# Patient Record
Sex: FEMALE | Race: WHITE | NOT HISPANIC OR LATINO | URBAN - METROPOLITAN AREA
[De-identification: names, ages, dates, MRNs, and addresses within clinical notes are randomized per-mention and may not be internally consistent; named-entity substitution may affect disease eponyms.]

---

## 2020-02-04 ENCOUNTER — APPOINTMENT (RX ONLY)
Dept: URBAN - METROPOLITAN AREA CLINIC 24 | Facility: CLINIC | Age: 30
Setting detail: DERMATOLOGY
End: 2020-02-04

## 2020-02-04 DIAGNOSIS — B00.1 HERPESVIRAL VESICULAR DERMATITIS: ICD-10-CM

## 2020-02-04 DIAGNOSIS — Z71.89 OTHER SPECIFIED COUNSELING: ICD-10-CM

## 2020-02-04 DIAGNOSIS — L81.4 OTHER MELANIN HYPERPIGMENTATION: ICD-10-CM
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
## 2020-02-04 PROBLEM — D23.9 OTHER BENIGN NEOPLASM OF SKIN, UNSPECIFIED: Status: ACTIVE | Noted: 2020-02-04

## 2020-02-04 PROCEDURE — ? COUNSELING

## 2020-02-04 PROCEDURE — ? ADDITIONAL NOTES

## 2020-02-04 PROCEDURE — 99203 OFFICE O/P NEW LOW 30 MIN: CPT

## 2020-02-04 ASSESSMENT — LOCATION ZONE DERM
LOCATION ZONE: TRUNK
LOCATION ZONE: LIP
LOCATION ZONE: ARM

## 2020-02-04 ASSESSMENT — LOCATION DETAILED DESCRIPTION DERM
LOCATION DETAILED: RIGHT SUPERIOR LATERAL UPPER BACK
LOCATION DETAILED: LEFT POSTERIOR SHOULDER
LOCATION DETAILED: LEFT ANTERIOR SHOULDER
LOCATION DETAILED: RIGHT INFERIOR VERMILION LIP

## 2020-02-04 ASSESSMENT — LOCATION SIMPLE DESCRIPTION DERM
LOCATION SIMPLE: LEFT SHOULDER
LOCATION SIMPLE: RIGHT LIP
LOCATION SIMPLE: RIGHT BACK

## 2020-02-04 NOTE — PROCEDURE: ADDITIONAL NOTES
Additional Notes: Pt is on valtrex. No refills needed. She will call.  Pt is on 500 mg. She can take 4tabs in am and 4tabs pm when flares. 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 She is being followed by gynecologist.  She claims lysine helps prevent breakouts.
Detail Level: Simple

## 2020-06-01 ENCOUNTER — HOSPITAL ENCOUNTER (OUTPATIENT)
Dept: PHYSICAL THERAPY | Age: 30
End: 2020-06-01
Payer: COMMERCIAL

## 2020-06-04 ENCOUNTER — HOSPITAL ENCOUNTER (OUTPATIENT)
Dept: PHYSICAL THERAPY | Age: 30
Discharge: HOME | End: 2020-06-04
Payer: COMMERCIAL

## 2020-06-04 PROCEDURE — 97110 THERAPEUTIC EXERCISES: CPT

## 2020-06-04 PROCEDURE — 97161 PT EVAL LOW COMPLEX 20 MIN: CPT

## 2020-06-04 NOTE — PROGRESS NOTES
Frankey Arnt  : 1990  Primary: [address]  Secondary:  [address]  at [facility]  [address] , [address], [place]  [phone]   [phone]      OUTPATIENT PHYSICAL THERAPY: Daily Treatment Note 2020  ICD-10: Treatment Diagnosis: N39.46 Mixed incontinence (Urge and stress incontinence), R27.8 Lack of coordination (muscle incoordination)   Precautions/Allergies:   Patient has no allergy information on record. TREATMENT PLAN:  Effective Dates: 2020 TO 2020 (90 days). Frequency/Duration: 1 time a week for 90 Day(s) MEDICAL/REFERRING DIAGNOSIS:  Mixed incontinence [N39.46]   DATE OF ONSET:   REFERRING PHYSICIAN: Mercy Bolivar MD MD Orders: evaluate and treat  Return MD Appointment: -     Pre-treatment Symptoms/Complaints:  See evaluation  Pain: Initial:   0 Post Session:  0/10   Medications Last Reviewed:  2020   Updated Objective Findings:  See evaluation note from today   TREATMENT:   THERAPEUTIC EXERCISE: (10 minutes):  Exercises per grid below to improve mobility, strength and coordination. Required minimal visual, verbal and manual cues to promote proper body alignment, promote proper body posture and promote proper body mechanics. Progressed resistance, range and repetitions as indicated. All exercises performed with emphasis on kegel and breathing in order improve coordination, awareness and to minimize symptoms. Date:  20 Date:   Date:     Activity/Exercise Parameters Parameters Parameters   Patient Education Discussed HEP and POC     breathing reviewed     drops reviewed                                Pt gives verbal consent to internal  assessment/treatment no chaperon present. [employer] Portal     Treatment/Session Summary:  Pt reports good understanding of plan of care, as well as prescribed home exercise program.  All questions were answered to pt's satisfaction.  Pt was invited to call with any further questions or concerns. · Response to Treatment:  see evaluation. · Communication/Consultation:  None today  · Equipment provided today:  None today  · Recommendations/Intent for next treatment session: Next visit will focus on biofeedback, manual therapy, stretches, urge suppression.   Total Treatment Billable Duration:  10 minutes  PT Patient Time In/Time Out  Time In: 1300  Time Out: [address], DPT    Future Appointments   Date Time Provider Everton Calabresei   6/10/2020  1:00 PM Namita Mills, DPT [facility]   6/16/2020  2:00 PM Namita Mills, DPT [facility]   6/23/2020  2:00 PM Namita Mills, DPT [facility]   6/30/2020  1:00 PM Namita Mills, DPT [facility]   7/7/2020  2:00 PM Namita Mills, DPT [facility]   7/14/2020  1:00 PM Namita Mills, DPT [facility]

## 2020-06-04 NOTE — THERAPY EVALUATION
Sonny Bell  : 1990  Primary: [address]  Secondary:  [address]  at [facility]  [address] , [address], [place] [address]  [phone]   [phone]       OUTPATIENT PHYSICAL THERAPY:Initial Assessment 2020   ICD-10: Treatment Diagnosis: N39.46 Mixed incontinence (Urge and stress incontinence), R27.8 Lack of coordination (muscle incoordination)   Precautions/Allergies:   Patient has no allergy information on record. TREATMENT PLAN:  Effective Dates: 2020 TO 2020 (90 days). Frequency/Duration: 1 time a week for 90 Day(s) MEDICAL/REFERRING DIAGNOSIS:  Mixed incontinence [N39.46]   DATE OF ONSET:   REFERRING PHYSICIAN: Lenin Jain MD MD Orders: evaluate and treat  Return MD Appointment: -     INITIAL ASSESSMENT:  Ms. Brianna Bauer presents with pelvic floor muscle over activity, lack of coordination, endurance and strength. She has a lot of stress in her life right now which could be contributing to the over activity. Patient would benefit from skilled physical therapy to address her deficits and maximize her function. PROBLEM LIST (Impacting functional limitations):  1. Decreased Strength  2. Decreased Flexibility/Joint Mobility  3. Decreased [place] with Home Exercise Program  4. Decreased coordination INTERVENTIONS PLANNED:  1. Family Education  2. Home Exercise Program (HEP)  3. Manual Therapy  4. Neuromuscular Re-education/Strengthening  5. Therapeutic Activites  6. Therapeutic Exercise/Strengthening     GOALS: (Goals have been discussed and agreed upon with patient.)  1. Patient will verbalize an understanding of pelvic anatomy and causes of incontinence   2. Patient will demonstrate I with basic PFM HEP to improve awareness, coordination, and timing of PFM  3. Patient will demonstrate \"the knack\" to eliminate UI during cough/sneeze  4.  Patient will demonstrate 10 quick flicks of the pelvic floor muscle group, without compensation, to implement urge suppression appropriately with urgency of urination and decrease the number of pads used per day. 5. Pt with demonstrate normal voluntary relaxation of the pelvic floor muscle group to improve pelvic floor ROM and decrease pain. 6. Pt will independently perform proper toilet position       Outcome Measure:   Pelvic Floor Impact Questionnaire (PFIQ-7)  Score (out of 300) Initial:   Bladder/urinary: 47  Bowel/rectum: 0  Vagina/pelvis: 0  Total: 47 Most Recent:      Interpretation of Score: This survey asks questions concerning certain bowel, bladder, or pelvic symptoms and how much these symptoms interfere with daily activities. Each section is scored on a 0-3 scale, 3 representing the greatest disability. The scores of each section (out of 100) are added together for a total score out of 300. Medical Necessity:   · Patient demonstrates GOOD rehab potential due to higher previous functional level. Reason for Services/Other Comments:  · Patient continues to require skilled intervention due to above mentioned deficits  . Total Duration:   PT Patient Time In/Time Out  Time In: 1300  Time Out: 1400    Rehabilitation Potential For Stated Goals: Good  Regarding [facility] therapy, I certify that the treatment plan above will be carried out by a therapist or under their direction. Thank you for this referral,  Pranav Coe DPT     Referring Physician Signature: Jayleen Guzman MD _______________________________ Date _____________              HISTORY:   History of Present Injury/Illness (Reason for Referral):  Ms. Lizzeth Gabriel is a 28 yo female referred to pelvic floor physical therapy (PFPT) by Jayleen Guzman MD 2/2 mixed incontinence. Pt reports that symptoms began April 2019. She doesn't know how it started. Reports she can do physical activity and its fine its when she has to jump or jump rope. Happens with sneezing not coughing.  If she suddenly gets the urge to go it will happen on the way to the bathroom. The urge hits pretty hard. Leakage is more pronounced in the afternoon. Has a lot more stress in her life right now with a career change. Urinary: Frequency 9-12 x/day, 0 x/night. Positive for stress urinary incontinence, urge urinary incontinence, urinary urgency and urinary frequency. Negative for incomplete emptying, urinary hesitancy/intermittency, dysuria, hematuria and nocturia. Pt does not use pads for protection; 0 pads per day (PPD). Fluid intake: 1 gallon water/day; bladder irritants include: herbal/green tea in am  Bowel: Frequency 2-3 times a day. Positive for none. Negative for pain with bowel movement, pushing/straining with bowel movement, fecal incontinence, constipation and incomplete emptying. Sexual: Pt is sexually active with male partners. Contraception/birth control: sprintex. negative for dyspareunia. Pelvic Organ Prolapse/Pelvic Pain: Location: none. OB History: none    Past Medical History/Comorbidities:   Ms. Letitia Sam  has no past medical history on file. Ms. Letitia Sam  has no past surgical history on file. Social History/Living Environment:     Lives with parents  Have you ever had any pelvic trauma (orthopedic in nature, fall, MVA, etc.)? NO   Have you ever experienced any unwanted physical or sexual contact? NO   Have you ever experienced any form of medical trauma (GYN, urological, GI, etc)? NO     Prior Level of Function/Work/Activity:  Exercise- yoga, walking, weight training  Working- [employer], [employer] department  Personal Factors:          Sex:  female        Age:  27 y.o. Ambulatory/Rehab Services H2 Model Falls Risk Assessment    Risk Factors:       No Risk Factors Identified Ability to Rise from Chair:       (0)  Ability to rise in a single movement    Falls Prevention Plan:       No modifications necessary   Total: (5 or greater = High Risk): 0    ©2010 [employer]. All Rights Reserved.  [place] Patent [id number]. Federal Law prohibits the replication, distribution or use without written permission from [facility]     Current Medications:     No current outpatient medications on file. Date Last Reviewed:  6/4/2020   Number of Personal Factors/Comorbidities that affect the Plan of Care: 0: LOW COMPLEXITY   EXAMINATION:   OBSERVATION:   External Observation:   · Voluntary Contraction: present  · Voluntary Relaxation: present, dealyed  · Vaginal Vault Size: within normal limits    PALPATION:  increased tone throughout PFM, no pain    RANGE OF MOTION:  Decreased due to tone    STRENGTH:  P: Power, E: Endurance, R: Repetitions, QF: Quick Flicks, TrA: Transverse Abdominus  P 2   E 1   R 3   QF    TrA      COORDINATION:  PFM Coordination: decreased  Diaphragmatic breathing (DB): able to perform with cues         Body Structures Involved:  1. Muscles Body Functions Affected:  1. Genitourinary  2. Neuromusculoskeletal  3. Movement Related Activities and Participation Affected:  1. Self Care  2. Domestic Life  3.  Interpersonal Interactions and Relationships   Number of elements (examined above) that affect the Plan of Care: 1-2: LOW COMPLEXITY   CLINICAL PRESENTATION:   Presentation: Stable and uncomplicated: LOW COMPLEXITY   CLINICAL DECISION MAKING:   Use of outcome tool(s) and clinical judgement create a POC that gives a: Clear prediction of patient's progress: LOW COMPLEXITY

## 2020-06-22 ENCOUNTER — HOSPITAL ENCOUNTER (OUTPATIENT)
Dept: PHYSICAL THERAPY | Age: 30
Discharge: HOME | End: 2020-06-22
Payer: COMMERCIAL

## 2020-06-22 PROCEDURE — 97112 NEUROMUSCULAR REEDUCATION: CPT

## 2020-06-22 PROCEDURE — 97140 MANUAL THERAPY 1/> REGIONS: CPT

## 2020-06-22 NOTE — PROGRESS NOTES
Scooter Ball  : 1990  Primary: [address]  Secondary:  [address]  at [facility]  [address], [address], [place] [address]  [phone]   [phone]      OUTPATIENT PHYSICAL THERAPY: Daily Treatment Note 2020  ICD-10: Treatment Diagnosis: N39.46 Mixed incontinence (Urge and stress incontinence), R27.8 Lack of coordination (muscle incoordination)   Precautions/Allergies:   Patient has no allergy information on record. TREATMENT PLAN:  Effective Dates: 2020 TO 2020 (90 days). Frequency/Duration: 1 time a week for 90 Day(s) MEDICAL/REFERRING DIAGNOSIS:  Mixed incontinence [N39.46]   DATE OF ONSET:   REFERRING PHYSICIAN: Ildefonso Delaney MD MD Orders: evaluate and treat  Return MD Appointment: -     Pre-treatment Symptoms/Complaints:  Patient reports she isn't having the sensation that she has to pull her pants down quick to go to the bathroom. She at first had a hard time feeling what relaxed was but now its better. Pain: Initial:   0 Post Session:  0/10   Medications Last Reviewed:  2020   Updated Objective Findings:  see below   Ms. Bee Murcia is a 28 yo female referred to pelvic floor physical therapy (PFPT) by Ildefonso Delaney MD  mixed incontinence. Pt reports that symptoms began 2019. She doesn't know how it started. Reports she can do physical activity and its fine its when she has to jump or jump rope. Happens with sneezing not coughing. If she suddenly gets the urge to go it will happen on the way to the bathroom. The urge hits pretty hard. Leakage is more pronounced in the afternoon. Has a lot more stress in her life right now with a career change.      Urinary: Frequency 9-12 x/day, 0 x/night. Positive for stress urinary incontinence, urge urinary incontinence, urinary urgency and urinary frequency. Negative for incomplete emptying, urinary hesitancy/intermittency, dysuria, hematuria and nocturia.  Pt does not use pads for protection; 0 pads per day (PPD). Fluid intake: 1 gallon water/day; bladder irritants include: herbal/green tea in am  Bowel: Frequency 2-3 times a day. Positive for none. Negative for pain with bowel movement, pushing/straining with bowel movement, fecal incontinence, constipation and incomplete emptying. Sexual: Pt is sexually active with male partners. Contraception/birth control: sprintex. negative for dyspareunia. Pelvic Organ Prolapse/Pelvic Pain: Location: none. OB History: none    External Observation:   · Voluntary Contraction: present  · Voluntary Relaxation: present, dealyed  · Vaginal Vault Size: within normal limits     PALPATION:  increased tone throughout PFM, no pain     RANGE OF MOTION:  Decreased due to tone     STRENGTH:  P: Power, E: Endurance, R: Repetitions, QF: Quick Flicks, TrA: Transverse Abdominus  P 2   E 1   R 3   QF     TrA        COORDINATION:  PFM Coordination: decreased  Diaphragmatic breathing (DB): able to perform with cues     TREATMENT:   THERAPEUTIC EXERCISE: (0 minutes):  Exercises per grid below to improve mobility, strength and coordination. Required minimal visual, verbal and manual cues to promote proper body alignment, promote proper body posture and promote proper body mechanics. Progressed resistance, range and repetitions as indicated. All exercises performed with emphasis on kegel and breathing in order improve coordination, awareness and to minimize symptoms. Date:  6-4-20 Date:   Date:     Activity/Exercise Parameters Parameters Parameters   Patient Education Discussed HEP and POC     breathing reviewed     drops reviewed                                Pt gives verbal consent to internal  assessment/treatment no chaperon present.      NEURO REEDUCATION: (30 minutes) to improve control and coordination of pelvic floor     Date:  6-22-20 Date:   Date:     Activity/Exercise Parameters Parameters Parameters   Biofeedback With sEMG was utilized for coordination of PFM. Supine, Sitting, Standing                                           MANUAL THERAPY: (30 minutes) to improve soft tissue tone and mobility  Date Type Location Comments   6/22/2020 Internal assessment/treatment Via vaginal canal SP, CTM                                         (Used abbreviations: MET - muscle energy technique; SCS- Strain counter strain; CTM-Connective tissue mobilizations; CR- Contract/relax; SP- Sustained pressure, TrP-Trigger point release, IASTM- Instrument assisted soft tissue mobilizations, TDN-Trigger point dry needling)      [facility] Portal     Treatment/Session Summary:  Pt reports good understanding of plan of care, as well as prescribed home exercise program.  All questions were answered to pt's satisfaction. Pt was invited to call with any further questions or concerns. · Response to Treatment:  Patient demonstrates better control of pelvic floor and with drops. Less tightness  · Communication/Consultation:  None today  · Equipment provided today:  None today  · Recommendations/Intent for next treatment session: Next visit will focus on biofeedback, manual therapy, stretches, urge suppression.   Total Treatment Billable Duration:  25 minutes manual, 30 minutes neuro  PT Patient Time In/Time Out  Time In: 0900  Time Out: [address], DPT    Future Appointments   Date Time Provider Everton Fishman   6/30/2020  1:00 PM CURTIS Rocha   7/7/2020  2:00 PM CURTIS Rocha   7/14/2020  1:00 PM CURTIS Rocha

## 2020-06-23 ENCOUNTER — APPOINTMENT (OUTPATIENT)
Dept: PHYSICAL THERAPY | Age: 30
End: 2020-06-23
Payer: COMMERCIAL

## 2020-06-30 ENCOUNTER — APPOINTMENT (RX ONLY)
Dept: URBAN - METROPOLITAN AREA CLINIC 24 | Facility: CLINIC | Age: 30
Setting detail: DERMATOLOGY
End: 2020-06-30

## 2020-06-30 ENCOUNTER — HOSPITAL ENCOUNTER (OUTPATIENT)
Dept: PHYSICAL THERAPY | Age: 30
Discharge: HOME | End: 2020-06-30
Payer: COMMERCIAL

## 2020-06-30 DIAGNOSIS — L71.0 PERIORAL DERMATITIS: ICD-10-CM

## 2020-06-30 DIAGNOSIS — D22 MELANOCYTIC NEVI: ICD-10-CM

## 2020-06-30 DIAGNOSIS — L84 CORNS AND CALLOSITIES: ICD-10-CM

## 2020-06-30 PROBLEM — D22.39 MELANOCYTIC NEVI OF OTHER PARTS OF FACE: Status: ACTIVE | Noted: 2020-06-30

## 2020-06-30 PROCEDURE — ? ADDITIONAL NOTES

## 2020-06-30 PROCEDURE — 97530 THERAPEUTIC ACTIVITIES: CPT

## 2020-06-30 PROCEDURE — 97110 THERAPEUTIC EXERCISES: CPT

## 2020-06-30 PROCEDURE — 97140 MANUAL THERAPY 1/> REGIONS: CPT

## 2020-06-30 PROCEDURE — 99213 OFFICE O/P EST LOW 20 MIN: CPT

## 2020-06-30 PROCEDURE — ? COUNSELING

## 2020-06-30 PROCEDURE — ? PRESCRIPTION

## 2020-06-30 PROCEDURE — ? TREATMENT REGIMEN

## 2020-06-30 RX ORDER — METRONIDAZOLE 7.5 MG/G
CREAM TOPICAL
Qty: 1 | Refills: 2 | Status: ERX | COMMUNITY
Start: 2020-06-30

## 2020-06-30 RX ADMIN — METRONIDAZOLE: 7.5 CREAM TOPICAL at 00:00

## 2020-06-30 ASSESSMENT — LOCATION SIMPLE DESCRIPTION DERM
LOCATION SIMPLE: RIGHT CHEEK
LOCATION SIMPLE: RIGHT 5TH TOE
LOCATION SIMPLE: LEFT CHEEK
LOCATION SIMPLE: CHIN
LOCATION SIMPLE: RIGHT SUBMANDIBULAR AREA

## 2020-06-30 ASSESSMENT — LOCATION DETAILED DESCRIPTION DERM
LOCATION DETAILED: LEFT MENTAL CREASE
LOCATION DETAILED: LEFT INFERIOR CENTRAL MALAR CHEEK
LOCATION DETAILED: RIGHT MEDIAL 5TH TOE
LOCATION DETAILED: RIGHT SUPERIOR MEDIAL BUCCAL CHEEK
LOCATION DETAILED: RIGHT SUBMANDIBULAR AREA

## 2020-06-30 ASSESSMENT — LOCATION ZONE DERM
LOCATION ZONE: FACE
LOCATION ZONE: TOE

## 2020-07-07 ENCOUNTER — HOSPITAL ENCOUNTER (OUTPATIENT)
Dept: PHYSICAL THERAPY | Age: 30
Discharge: HOME | End: 2020-07-07
Payer: COMMERCIAL

## 2020-07-07 PROCEDURE — 97110 THERAPEUTIC EXERCISES: CPT

## 2020-07-07 PROCEDURE — 97140 MANUAL THERAPY 1/> REGIONS: CPT

## 2020-07-07 NOTE — PROGRESS NOTES
Vikas Vieyra  : 1990  Primary: [address]  Secondary:  [address]  at [facility]  [address], [address], [place] [address]  [phone]   [phone]      OUTPATIENT PHYSICAL THERAPY: Daily Treatment Note 2020  ICD-10: Treatment Diagnosis: N39.46 Mixed incontinence (Urge and stress incontinence), R27.8 Lack of coordination (muscle incoordination)   Precautions/Allergies:   Patient has no allergy information on record. TREATMENT PLAN:  Effective Dates: 2020 TO 2020 (90 days). Frequency/Duration: 1 time a week for 90 Day(s) MEDICAL/REFERRING DIAGNOSIS:  Mixed incontinence [N39.46]   DATE OF ONSET:   REFERRING PHYSICIAN: Jolynn Maldonado MD MD Orders: evaluate and treat  Return MD Appointment: -     Pre-treatment Symptoms/Complaints:  Patient reports the strengthening exercises are tougher than she thought. She is having trouble with clamshells  The urge suppression has helped. She has had a stress full week. Pain: Initial:   0 Post Session:  0/10   Medications Last Reviewed:  2020   Updated Objective Findings:  see below   Ms. Prabhakar Richard is a 28 yo female referred to pelvic floor physical therapy (PFPT) by Jolynn Maldonado MD  mixed incontinence. Pt reports that symptoms began 2019. She doesn't know how it started. Reports she can do physical activity and its fine its when she has to jump or jump rope. Happens with sneezing not coughing. If she suddenly gets the urge to go it will happen on the way to the bathroom. The urge hits pretty hard. Leakage is more pronounced in the afternoon. Has a lot more stress in her life right now with a career change.      Urinary: Frequency 9-12 x/day, 0 x/night. Positive for stress urinary incontinence, urge urinary incontinence, urinary urgency and urinary frequency. Negative for incomplete emptying, urinary hesitancy/intermittency, dysuria, hematuria and nocturia.  Pt does not use pads for protection; 0 pads per day (PPD). Fluid intake: 1 gallon water/day; bladder irritants include: herbal/green tea in am  Bowel: Frequency 2-3 times a day. Positive for none. Negative for pain with bowel movement, pushing/straining with bowel movement, fecal incontinence, constipation and incomplete emptying. Sexual: Pt is sexually active with male partners. Contraception/birth control: sprintex. negative for dyspareunia. Pelvic Organ Prolapse/Pelvic Pain: Location: none. OB History: none    External Observation:   · Voluntary Contraction: present  · Voluntary Relaxation: present, dealyed  · Vaginal Vault Size: within normal limits     PALPATION:  increased tone throughout PFM, no pain     RANGE OF MOTION:  Decreased due to tone     STRENGTH:  P: Power, E: Endurance, R: Repetitions, QF: Quick Flicks, TrA: Transverse Abdominus  P 2   E 1   R 3   QF     TrA        COORDINATION:  PFM Coordination: decreased  Diaphragmatic breathing (DB): able to perform with cues     TREATMENT:   THERAPEUTIC EXERCISE: (30 minutes):  Exercises per grid below to improve mobility, strength and coordination. Required minimal visual, verbal and manual cues to promote proper body alignment, promote proper body posture and promote proper body mechanics. Progressed resistance, range and repetitions as indicated. All exercises performed with emphasis on kegel and breathing in order improve coordination, awareness and to minimize symptoms.     Date:  6-4-20 Date:  6-30-20 Date:  7-7-20   Activity/Exercise Parameters Parameters Parameters   Patient Education Discussed HEP and POC     breathing reviewed       drops reviewed       SKTC     HEP   Figure 4     HEP   butterfly     HEP   bridge  x20   x20 blue band   clamshell  x20   x20 blue band   Quadruped  x20   x20   Sit to stand    x20 x20 blue band   Rows     x20 blue band   Extensions   x20 blue band        Pt gives verbal consent to internal  assessment/treatment no chaperon present. NEURO REEDUCATION: ( minutes) to improve control and coordination of pelvic floor     Date:  6-22-20 Date:   Date:     Activity/Exercise Parameters Parameters Parameters   Biofeedback With sEMG was utilized for coordination of PFM. Supine, Sitting, Standing                                           MANUAL THERAPY: (30 minutes) to improve soft tissue tone and mobility  Date Type Location Comments   7/7/2020 Internal assessment/treatment Via vaginal canal SP, CTM                                         (Used abbreviations: MET - muscle energy technique; SCS- Strain counter strain; CTM-Connective tissue mobilizations; CR- Contract/relax; SP- Sustained pressure, TrP-Trigger point release, IASTM- Instrument assisted soft tissue mobilizations, TDN-Trigger point dry needling)    THERAPEUTIC ACTIVITY: (0 minutes)   -discussed, reviewed, performed urge suppression      [employer] Portal     Treatment/Session Summary:  Pt reports good understanding of plan of care, as well as prescribed home exercise program.  All questions were answered to pt's satisfaction. Pt was invited to call with any further questions or concerns. · Response to Treatment:  Patient with mild increase tightness in pelvic floor today due to stress levels over the weekend. · Communication/Consultation:  None today  · Equipment provided today:  None today  · Recommendations/Intent for next treatment session: Next visit will focus on biofeedback, manual therapy, stretches, urge suppression.   Total Treatment Billable Duration:  30 minutes manual, 25 minutes there ex  PT Patient Time In/Time Out  Time In: 1400  Time Out: [address] Shravan Lawson DPT    Future Appointments   Date Time Provider Everton Fishman   7/14/2020  1:00 PM Marcelo Arroyo DPT [facility]

## 2020-07-15 ENCOUNTER — APPOINTMENT (RX ONLY)
Dept: URBAN - METROPOLITAN AREA CLINIC 23 | Facility: CLINIC | Age: 30
Setting detail: DERMATOLOGY
End: 2020-07-15

## 2020-07-15 ENCOUNTER — HOSPITAL ENCOUNTER (OUTPATIENT)
Dept: PHYSICAL THERAPY | Age: 30
Discharge: HOME | End: 2020-07-15
Payer: COMMERCIAL

## 2020-07-15 DIAGNOSIS — L71.0 PERIORAL DERMATITIS: ICD-10-CM | Status: RESOLVING

## 2020-07-15 PROCEDURE — 97140 MANUAL THERAPY 1/> REGIONS: CPT

## 2020-07-15 PROCEDURE — ? TREATMENT REGIMEN

## 2020-07-15 PROCEDURE — 97110 THERAPEUTIC EXERCISES: CPT

## 2020-07-15 PROCEDURE — 99212 OFFICE O/P EST SF 10 MIN: CPT

## 2020-07-15 PROCEDURE — ? COUNSELING

## 2020-07-15 ASSESSMENT — LOCATION SIMPLE DESCRIPTION DERM
LOCATION SIMPLE: LEFT CHEEK
LOCATION SIMPLE: RIGHT CHEEK
LOCATION SIMPLE: CHIN

## 2020-07-15 ASSESSMENT — LOCATION DETAILED DESCRIPTION DERM
LOCATION DETAILED: LEFT INFERIOR CENTRAL MALAR CHEEK
LOCATION DETAILED: RIGHT SUPERIOR MEDIAL BUCCAL CHEEK
LOCATION DETAILED: LEFT MENTAL CREASE

## 2020-07-15 ASSESSMENT — LOCATION ZONE DERM: LOCATION ZONE: FACE

## 2020-07-15 NOTE — PROGRESS NOTES
Karla Ernie  : 1990  Primary: [address]  Secondary:  [address]  at [facility]  [address], [place] [address]  [phone]   [phone]      OUTPATIENT PHYSICAL THERAPY: Daily Treatment Note 7/15/2020  ICD-10: Treatment Diagnosis: N39.46 Mixed incontinence (Urge and stress incontinence), R27.8 Lack of coordination (muscle incoordination)   Precautions/Allergies:   Patient has no allergy information on record. TREATMENT PLAN:  Effective Dates: 2020 TO 2020 (90 days). Frequency/Duration: 1 time a week for 90 Day(s) MEDICAL/REFERRING DIAGNOSIS:  Mixed incontinence [N39.46]   DATE OF ONSET:   REFERRING PHYSICIAN: Nataly Boykin MD MD Orders: evaluate and treat  Return MD Appointment: -     Pre-treatment Symptoms/Complaints:  Patient reports that her stress levels have been higher which have caused her to have a difficult time when she is relaxing. She feels like her symptoms have hit a plateau. Pain: Initial:   0 Post Session:  0/10   Medications Last Reviewed:  7/15/2020   Updated Objective Findings:  see below   Ms. Bea Sanchez is a [address] [place] yo female referred to pelvic floor physical therapy (PFPT) by Nataly Boykin MD  mixed incontinence. Pt reports that symptoms began 2019. She doesn't know how it started. Reports she can do physical activity and its fine its when she has to jump or jump rope. Happens with sneezing not coughing. If she suddenly gets the urge to go it will happen on the way to the bathroom. The urge hits pretty hard. Leakage is more pronounced in the afternoon. Has a lot more stress in her life right now with a career change.      Urinary: Frequency 9-12 x/day, 0 x/night. Positive for stress urinary incontinence, urge urinary incontinence, urinary urgency and urinary frequency. Negative for incomplete emptying, urinary hesitancy/intermittency, dysuria, hematuria and nocturia.  Pt does not use pads for protection; 0 pads per day (PPD). Fluid intake: 1 gallon water/day; bladder irritants include: herbal/green tea in am  Bowel: Frequency 2-3 times a day. Positive for none. Negative for pain with bowel movement, pushing/straining with bowel movement, fecal incontinence, constipation and incomplete emptying. Sexual: Pt is sexually active with male partners. Contraception/birth control: sprintex. negative for dyspareunia. Pelvic Organ Prolapse/Pelvic Pain: Location: none. OB History: none    External Observation:   · Voluntary Contraction: present  · Voluntary Relaxation: present, dealyed  · Vaginal Vault Size: within normal limits     PALPATION:  increased tone throughout PFM, no pain     RANGE OF MOTION:  Decreased due to tone     STRENGTH:  P: Power, E: Endurance, R: Repetitions, QF: Quick Flicks, TrA: Transverse Abdominus  P 2   E 1   R 3   QF     TrA        COORDINATION:  PFM Coordination: decreased  Diaphragmatic breathing (DB): able to perform with cues     TREATMENT:   THERAPEUTIC EXERCISE: (30 minutes):  Exercises per grid below to improve mobility, strength and coordination. Required minimal visual, verbal and manual cues to promote proper body alignment, promote proper body posture and promote proper body mechanics. Progressed resistance, range and repetitions as indicated. All exercises performed with emphasis on kegel and breathing in order improve coordination, awareness and to minimize symptoms.     Date:  6-4-20 Date:  6-30-20 Date:  7-7-20 Date:  7-15-20   Activity/Exercise Parameters Parameters Parameters    Patient Education Discussed HEP and POC      breathing reviewed        drops reviewed        SKTC     HEP    Figure 4     HEP    butterfly     HEP    bridge  x20   x20 blue band x20 blue band   clamshell  x20   x20 blue band x20 blue band   Quadruped  x20   x20    Sit to stand    x20 x20 blue band x20 blue band   Rows     x20 blue band x20 blue band   Extensions   x20 blue band   x20 blue band      Pt gives verbal consent to internal  assessment/treatment no chaperon present. NEURO REEDUCATION: ( minutes) to improve control and coordination of pelvic floor     Date:  6-22-20 Date:   Date:     Activity/Exercise Parameters Parameters Parameters   Biofeedback With sEMG was utilized for coordination of PFM. Supine, Sitting, Standing                                           MANUAL THERAPY: (30 minutes) to improve soft tissue tone and mobility  Date Type Location Comments   7/15/2020 Internal assessment/treatment Via vaginal canal SP, CTM                                         (Used abbreviations: MET - muscle energy technique; SCS- Strain counter strain; CTM-Connective tissue mobilizations; CR- Contract/relax; SP- Sustained pressure, TrP-Trigger point release, IASTM- Instrument assisted soft tissue mobilizations, TDN-Trigger point dry needling)    THERAPEUTIC ACTIVITY: (0 minutes)   -discussed, reviewed, performed urge suppression      [employer] Portal     Treatment/Session Summary:  Pt reports good understanding of plan of care, as well as prescribed home exercise program.  All questions were answered to pt's satisfaction. Pt was invited to call with any further questions or concerns. · Response to Treatment:  Patient with mild increase tightness in pelvic floor today due to stress levels over the weekend. Improvement in coordination with there ex   · Communication/Consultation:  None today  · Equipment provided today:  None today  · Recommendations/Intent for next treatment session: Next visit will focus on biofeedback, manual therapy, stretches, urge suppression. Total Treatment Billable Duration:  30 minutes manual, 25 minutes there ex  PT Patient Time In/Time Out  Time In: 1400  Time Out: Elina Vaz[address], DPT    No future appointments.

## 2020-07-15 NOTE — PROCEDURE: TREATMENT REGIMEN
Detail Level: Zone
Initiate Treatment: Metronidazole cream
Plan: Recommended patient use metronidazole for 30 days.
Discontinue Regimen: Elidel

## 2020-07-20 ENCOUNTER — RX ONLY (OUTPATIENT)
Age: 30
Setting detail: RX ONLY
End: 2020-07-20

## 2020-07-20 RX ORDER — PIMECROLIMUS 10 MG/G
CREAM TOPICAL
Qty: 1 | Refills: 0 | Status: ERX | COMMUNITY
Start: 2020-07-20

## 2020-07-20 RX ORDER — DOXYCYCLINE 100 MG/1
CAPSULE ORAL
Qty: 70 | Refills: 0 | Status: ERX | COMMUNITY
Start: 2020-07-20

## 2020-07-21 ENCOUNTER — APPOINTMENT (OUTPATIENT)
Dept: PHYSICAL THERAPY | Age: 30
End: 2020-07-21
Payer: COMMERCIAL

## 2020-07-23 ENCOUNTER — HOSPITAL ENCOUNTER (OUTPATIENT)
Dept: PHYSICAL THERAPY | Age: 30
Discharge: HOME | End: 2020-07-23
Payer: COMMERCIAL

## 2020-07-23 PROCEDURE — 97140 MANUAL THERAPY 1/> REGIONS: CPT

## 2020-07-23 PROCEDURE — 97110 THERAPEUTIC EXERCISES: CPT

## 2020-07-23 NOTE — PROGRESS NOTES
Indiranejessica Jani  : 1990  Primary: [address]  Secondary:  [address]  at [facility]  [address] , [address], [place] [address]  [phone]   [phone]      OUTPATIENT PHYSICAL THERAPY: Daily Treatment Note 2020  ICD-10: Treatment Diagnosis: N39.46 Mixed incontinence (Urge and stress incontinence), R27.8 Lack of coordination (muscle incoordination)   Precautions/Allergies:   Patient has no allergy information on record. TREATMENT PLAN:  Effective Dates: 2020 TO 2020 (90 days). Frequency/Duration: 1 time a week for 90 Day(s) MEDICAL/REFERRING DIAGNOSIS:  Mixed incontinence [N39.46]   DATE OF ONSET:   REFERRING PHYSICIAN: Melody Mcpherson MD MD Orders: evaluate and treat  Return MD Appointment: -     Pre-treatment Symptoms/Complaints:  Patient reports she is now on doxycyclin for her face for 6 weeks. She has increased a lot of water intake. She sneezed and had leakage but she doesn't know if its b/c she had been drinking more water. Urge suppression is still working. Pain: Initial:   0 Post Session:  0/10   Medications Last Reviewed:  2020   Updated Objective Findings:  see below   Ms. Sabrina Vaughn is a 28 yo female referred to pelvic floor physical therapy (PFPT) by Melody Mcpherson MD  mixed incontinence. Pt reports that symptoms began 2019. She doesn't know how it started. Reports she can do physical activity and its fine its when she has to jump or jump rope. Happens with sneezing not coughing. If she suddenly gets the urge to go it will happen on the way to the bathroom. The urge hits pretty hard. Leakage is more pronounced in the afternoon. Has a lot more stress in her life right now with a career change.      Urinary: Frequency 9-12 x/day, 0 x/night. Positive for stress urinary incontinence, urge urinary incontinence, urinary urgency and urinary frequency.  Negative for incomplete emptying, urinary hesitancy/intermittency, dysuria, hematuria and nocturia. Pt does not use pads for protection; 0 pads per day (PPD). Fluid intake: 1 gallon water/day; bladder irritants include: herbal/green tea in am  Bowel: Frequency 2-3 times a day. Positive for none. Negative for pain with bowel movement, pushing/straining with bowel movement, fecal incontinence, constipation and incomplete emptying. Sexual: Pt is sexually active with male partners. Contraception/birth control: sprintex. negative for dyspareunia. Pelvic Organ Prolapse/Pelvic Pain: Location: none. OB History: none    External Observation:   · Voluntary Contraction: present  · Voluntary Relaxation: present, dealyed  · Vaginal Vault Size: within normal limits     PALPATION:  increased tone throughout PFM, no pain     RANGE OF MOTION:  Decreased due to tone     STRENGTH:  P: Power, E: Endurance, R: Repetitions, QF: Quick Flicks, TrA: Transverse Abdominus  P 2   E 1   R 3   QF     TrA        COORDINATION:  PFM Coordination: decreased  Diaphragmatic breathing (DB): able to perform with cues     TREATMENT:   THERAPEUTIC EXERCISE: (30 minutes):  Exercises per grid below to improve mobility, strength and coordination. Required minimal visual, verbal and manual cues to promote proper body alignment, promote proper body posture and promote proper body mechanics. Progressed resistance, range and repetitions as indicated. All exercises performed with emphasis on kegel and breathing in order improve coordination, awareness and to minimize symptoms.     Date:  6-4-20 Date:  6-30-20 Date:  7-7-20 Date:  7-15-20 Date:  7-23-20   Activity/Exercise Parameters Parameters Parameters     Patient Education Discussed HEP and POC       breathing reviewed         drops reviewed         SKTC     HEP     Figure 4     HEP     butterfly     HEP     bridge  x20   x20 blue band x20 blue band x20 blue band   clamshell  x20   x20 blue band x20 blue band x20 blue band   Quadruped x20   x20     Sit to stand    x20 x20 blue band x20 blue band x20 blue band   Rows     x20 blue band x20 blue band x20 blue band   Extensions   x20 blue band   x20 blue band x20 blue band   Sidestepping     50 feet x 2      Pt gives verbal consent to internal  assessment/treatment no chaperon present. NEURO REEDUCATION: ( minutes) to improve control and coordination of pelvic floor     Date:  6-22-20 Date:   Date:     Activity/Exercise Parameters Parameters Parameters   Biofeedback With sEMG was utilized for coordination of PFM. Supine, Sitting, Standing                                           MANUAL THERAPY: (30 minutes) to improve soft tissue tone and mobility  Date Type Location Comments   7/23/2020 Internal assessment/treatment Via vaginal canal SP, CTM                                         (Used abbreviations: MET - muscle energy technique; SCS- Strain counter strain; CTM-Connective tissue mobilizations; CR- Contract/relax; SP- Sustained pressure, TrP-Trigger point release, IASTM- Instrument assisted soft tissue mobilizations, TDN-Trigger point dry needling)    THERAPEUTIC ACTIVITY: (0 minutes)   -discussed, reviewed, performed urge suppression      [employer] Portal     Treatment/Session Summary:  Pt reports good understanding of plan of care, as well as prescribed home exercise program.  All questions were answered to pt's satisfaction. Pt was invited to call with any further questions or concerns. · Response to Treatment:  Patient had less tightness to pelvic floor today. Progressing with there ex. · Communication/Consultation:  None today  · Equipment provided today:  None today  · Recommendations/Intent for next treatment session: Next visit will focus on biofeedback, manual therapy, stretches, urge suppression.   Total Treatment Billable Duration:  30 minutes manual, 25 minutes there ex  PT Patient Time In/Time Out  Time In: 1400  Time Out: [address], T    Future Appointments Date Time Provider Everton Fishman   7/29/2020 11:00 AM Alcides Garcia, DPT [facility]

## 2020-08-04 NOTE — PROGRESS NOTES
Dyan Ramemy  : 1990  Primary: [address]  Secondary:  [address]  at [facility]  [address], [address], [place] [address]  [phone]   [phone]      OUTPATIENT PHYSICAL THERAPY: Daily Treatment Note 2020  ICD-10: Treatment Diagnosis: N39.46 Mixed incontinence (Urge and stress incontinence), R27.8 Lack of coordination (muscle incoordination)   Precautions/Allergies:   Patient has no allergy information on record. TREATMENT PLAN:  Effective Dates: 2020 TO 2020 (90 days). Frequency/Duration: 1 time a week for 90 Day(s) MEDICAL/REFERRING DIAGNOSIS:  Mixed incontinence [N39.46]   DATE OF ONSET:   REFERRING PHYSICIAN: Amish Mujica MD MD Orders: evaluate and treat  Return MD Appointment: September (telehealth)     Pre-treatment Symptoms/Complaints:  Patient reports leakage is status quo. She was sick the past two weeks. She still needs to work on her relaxation. Pain: Initial:   0 Post Session:  0/10   Medications Last Reviewed:  2020   Updated Objective Findings:  see below   Ms. Patrick Bahena is a 28 yo female referred to pelvic floor physical therapy (PFPT) by Amish Mujica MD  mixed incontinence. Pt reports that symptoms began 2019. She doesn't know how it started. Reports she can do physical activity and its fine its when she has to jump or jump rope. Happens with sneezing not coughing. If she suddenly gets the urge to go it will happen on the way to the bathroom. The urge hits pretty hard. Leakage is more pronounced in the afternoon. Has a lot more stress in her life right now with a career change.      Urinary: Frequency 9-12 x/day, 0 x/night. Positive for stress urinary incontinence, urge urinary incontinence, urinary urgency and urinary frequency. Negative for incomplete emptying, urinary hesitancy/intermittency, dysuria, hematuria and nocturia. Pt does not use pads for protection; 0 pads per day (PPD). Fluid intake: 1 gallon water/day; bladder irritants include: herbal/green tea in am  Bowel: Frequency 2-3 times a day. Positive for none. Negative for pain with bowel movement, pushing/straining with bowel movement, fecal incontinence, constipation and incomplete emptying. Sexual: Pt is sexually active with male partners. Contraception/birth control: sprintex. negative for dyspareunia. Pelvic Organ Prolapse/Pelvic Pain: Location: none. OB History: none    External Observation:   · Voluntary Contraction: present  · Voluntary Relaxation: present, dealyed  · Vaginal Vault Size: within normal limits     PALPATION:  increased tone throughout PFM, no pain     RANGE OF MOTION:  Decreased due to tone     STRENGTH:  P: Power, E: Endurance, R: Repetitions, QF: Quick Flicks, TrA: Transverse Abdominus  P 2   E 1   R 3   QF     TrA        COORDINATION:  PFM Coordination: decreased  Diaphragmatic breathing (DB): able to perform with cues     TREATMENT:   THERAPEUTIC EXERCISE: (15 minutes):  Exercises per grid below to improve mobility, strength and coordination. Required minimal visual, verbal and manual cues to promote proper body alignment, promote proper body posture and promote proper body mechanics. Progressed resistance, range and repetitions as indicated. All exercises performed with emphasis on kegel and breathing in order improve coordination, awareness and to minimize symptoms.     Date:  6-4-20 Date:  6-30-20 Date:  7-7-20 Date:  7-15-20 Date:  7-23-20 Date:  8-5-20   Activity/Exercise Parameters Parameters Parameters      Patient Education Discussed HEP and POC        breathing reviewed          drops reviewed          SKTC     HEP   30 sec hold x 3   Figure 4     HEP   30 sec hold x 3   butterfly     HEP   30 sec hold x 3   bridge  x20   x20 blue band x20 blue band x20 blue band    clamshell  x20   x20 blue band x20 blue band x20 blue band    Quadruped  x20   x20      Sit to stand    x20 x20 blue band x20 blue band x20 blue band    Rows     x20 blue band x20 blue band x20 blue band    Extensions   x20 blue band   x20 blue band x20 blue band    Sidestepping     50 feet x 2       Pt gives verbal consent to internal  assessment/treatment no chaperon present. NEURO REEDUCATION: ( minutes) to improve control and coordination of pelvic floor     Date:  6-22-20 Date:   Date:     Activity/Exercise Parameters Parameters Parameters   Biofeedback With sEMG was utilized for coordination of PFM. Supine, Sitting, Standing                                           MANUAL THERAPY: (15 minutes) to improve soft tissue tone and mobility  Date Type Location Comments   8/5/2020 Internal assessment/treatment Via vaginal canal SP, CTM                                         (Used abbreviations: MET - muscle energy technique; SCS- Strain counter strain; CTM-Connective tissue mobilizations; CR- Contract/relax; SP- Sustained pressure, TrP-Trigger point release, IASTM- Instrument assisted soft tissue mobilizations, TDN-Trigger point dry needling)    THERAPEUTIC ACTIVITY: (0 minutes)   -discussed, reviewed, performed urge suppression      [employer] Portal     Treatment/Session Summary:  Pt reports good understanding of plan of care, as well as prescribed home exercise program.  All questions were answered to pt's satisfaction. Pt was invited to call with any further questions or concerns. · Response to Treatment:  Patient was 30 minutes late to appointment today due to work conflict. Continues to have tightness to pelvic floor. Relaxed with manual.   · Communication/Consultation:  None today  · Equipment provided today:  None today  · Recommendations/Intent for next treatment session: Next visit will focus on biofeedback, manual therapy, stretches, urge suppression.   Total Treatment Billable Duration:  15 minutes manual, 15 minutes there ex  PT Patient Time In/Time Out  Time In: 1030  Time Out: [address], DPT    No future appointments.

## 2020-08-05 ENCOUNTER — HOSPITAL ENCOUNTER (OUTPATIENT)
Dept: PHYSICAL THERAPY | Age: 30
Discharge: HOME | End: 2020-08-05
Payer: COMMERCIAL

## 2020-08-05 PROCEDURE — 97140 MANUAL THERAPY 1/> REGIONS: CPT

## 2020-08-05 PROCEDURE — 97110 THERAPEUTIC EXERCISES: CPT

## 2020-08-06 ENCOUNTER — APPOINTMENT (OUTPATIENT)
Dept: PHYSICAL THERAPY | Age: 30
End: 2020-08-06
Payer: COMMERCIAL

## 2020-08-12 ENCOUNTER — HOSPITAL ENCOUNTER (OUTPATIENT)
Dept: PHYSICAL THERAPY | Age: 30
Discharge: HOME | End: 2020-08-12
Payer: COMMERCIAL

## 2020-08-12 PROCEDURE — 97140 MANUAL THERAPY 1/> REGIONS: CPT

## 2020-08-12 PROCEDURE — 97110 THERAPEUTIC EXERCISES: CPT
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
## 2020-08-18 NOTE — PROGRESS NOTES
I am accessing Ms. Maldonado's chart as a part of our department's internal chart auditing process. I certify that Ms. Maldonado is, or was, a patient in our department.   Thank you,  Marta Whitmore, PT, DPT  8/18/2020

## 2020-08-19 ENCOUNTER — HOSPITAL ENCOUNTER (OUTPATIENT)
Dept: PHYSICAL THERAPY | Age: 30
Discharge: HOME | End: 2020-08-19
Payer: COMMERCIAL

## 2020-08-19 PROCEDURE — 97110 THERAPEUTIC EXERCISES: CPT

## 2020-08-19 PROCEDURE — 97140 MANUAL THERAPY 1/> REGIONS: CPT

## 2020-08-19 NOTE — PROGRESS NOTES
Marilyn Waite  : 1990  Primary: [address]  Secondary:  [address]  at [facility]  [address], [address], [place] [address]  [phone]   [phone]      OUTPATIENT PHYSICAL THERAPY: Daily Treatment Note 2020  ICD-10: Treatment Diagnosis: N39.46 Mixed incontinence (Urge and stress incontinence), R27.8 Lack of coordination (muscle incoordination)   Precautions/Allergies:   Patient has no allergy information on record. TREATMENT PLAN:  Effective Dates: 2020 TO 2020 (90 days). Frequency/Duration: 1 time a week for 90 Day(s) MEDICAL/REFERRING DIAGNOSIS:  Mixed incontinence [N39.46]   DATE OF ONSET:   REFERRING PHYSICIAN: Marley Arenas MD MD Orders: evaluate and treat  Return MD Appointment: September (telehealth)     Pre-treatment Symptoms/Complaints:  Patient reports she has seen improvements in \"accidents\" since starting her spinal  Yoga this past week. Pain: Initial:   0 Post Session:  0/10   Medications Last Reviewed:  2020   Updated Objective Findings:  see below   Ms. Kimberlee Regalado is a 28 yo female referred to pelvic floor physical therapy (PFPT) by Marley Arenas MD  mixed incontinence. Pt reports that symptoms began 2019. She doesn't know how it started. Reports she can do physical activity and its fine its when she has to jump or jump rope. Happens with sneezing not coughing. If she suddenly gets the urge to go it will happen on the way to the bathroom. The urge hits pretty hard. Leakage is more pronounced in the afternoon. Has a lot more stress in her life right now with a career change.      Urinary: Frequency 9-12 x/day, 0 x/night. Positive for stress urinary incontinence, urge urinary incontinence, urinary urgency and urinary frequency. Negative for incomplete emptying, urinary hesitancy/intermittency, dysuria, hematuria and nocturia. Pt does not use pads for protection; 0 pads per day (PPD). Fluid intake: 1 gallon water/day; bladder irritants include: herbal/green tea in am  Bowel: Frequency 2-3 times a day. Positive for none. Negative for pain with bowel movement, pushing/straining with bowel movement, fecal incontinence, constipation and incomplete emptying. Sexual: Pt is sexually active with male partners. Contraception/birth control: sprintex. negative for dyspareunia. Pelvic Organ Prolapse/Pelvic Pain: Location: none. OB History: none    External Observation:   · Voluntary Contraction: present  · Voluntary Relaxation: present, dealyed  · Vaginal Vault Size: within normal limits     PALPATION:  increased tone throughout PFM, no pain     RANGE OF MOTION:  Decreased due to tone     STRENGTH:  P: Power, E: Endurance, R: Repetitions, QF: Quick Flicks, TrA: Transverse Abdominus  P 2   E 1   R 3   QF     TrA        COORDINATION:  PFM Coordination: decreased  Diaphragmatic breathing (DB): able to perform with cues     TREATMENT:   THERAPEUTIC EXERCISE: (30 minutes):  Exercises per grid below to improve mobility, strength and coordination. Required minimal visual, verbal and manual cues to promote proper body alignment, promote proper body posture and promote proper body mechanics. Progressed resistance, range and repetitions as indicated. All exercises performed with emphasis on kegel and breathing in order improve coordination, awareness and to minimize symptoms.     Date:  6-4-20 Date:  6-30-20 Date:  7-7-20 Date:  7-15-20 Date:  7-23-20 Date:  8-5-20 Date:  8-12-20 Date:  8-19-20   Activity/Exercise Parameters Parameters Parameters        Patient Education Discussed HEP and POC      Discussed bladder diary, yoga stretches for low back    breathing reviewed            drops reviewed            LTR          10 sec hold at end ranges x 10   SKTC     HEP   30 sec hold x 3     Figure 4     HEP   30 sec hold x 3  30 sec hold x 3   butterfly     HEP   30 sec hold x 3     bridge  x20   x20 blue band x20 blue band x20 blue band      clamshell  x20   x20 blue band x20 blue band x20 blue band      Quadruped  x20   x20        Sit to stand    x20 x20 blue band x20 blue band x20 blue band      Rows     x20 blue band x20 blue band x20 blue band   x20 blue band   Extensions   x20 blue band   x20 blue band x20 blue band   x20 blue band   Sidestepping     50 feet x 2   50 feet x 2 green band   Backwards          50 feet x 2 green band      Pt gives verbal consent to internal  assessment/treatment no chaperon present. NEURO REEDUCATION: ( minutes) to improve control and coordination of pelvic floor     Date:  6-22-20 Date:   Date:     Activity/Exercise Parameters Parameters Parameters   Biofeedback With sEMG was utilized for coordination of PFM. Supine, Sitting, Standing                                           MANUAL THERAPY: (30 minutes) to improve soft tissue tone and mobility  Date Type Location Comments   8/19/2020 Internal assessment/treatment Via vaginal canal SP, CTM, drops                                         (Used abbreviations: MET - muscle energy technique; SCS- Strain counter strain; CTM-Connective tissue mobilizations; CR- Contract/relax; SP- Sustained pressure, TrP-Trigger point release, IASTM- Instrument assisted soft tissue mobilizations, TDN-Trigger point dry needling)    THERAPEUTIC ACTIVITY: (0 minutes)   -discussed, reviewed, performed urge suppression      [employer] [facility]     Treatment/Session Summary:  Pt reports good understanding of plan of care, as well as prescribed home exercise program.  All questions were answered to pt's satisfaction. Pt was invited to call with any further questions or concerns. · Response to Treatment:  Patient demonstrates dramatic improvement in pelvic floor tightness today. Also, demonstrates good improvement in PFM contraction. Educated patient to continue yoga at home.    · Communication/Consultation:  None today  · Equipment provided today:  None today  · Recommendations/Intent for next treatment session: Next visit will focus on biofeedback, manual therapy, stretches, urge suppression.   Total Treatment Billable Duration:  25 minutes there ex, 30 minutes manaul   PT Patient Time In/Time Out  Time In: 1300  Time Out: [address], DPT    Future Appointments   Date Time Provider Everton Fishman   9/2/2020  1:00 PM Julio C Montano, DPT [facility]

## 2020-09-02 ENCOUNTER — HOSPITAL ENCOUNTER (OUTPATIENT)
Dept: PHYSICAL THERAPY | Age: 30
Discharge: HOME | End: 2020-09-02
Payer: COMMERCIAL

## 2020-09-02 PROCEDURE — 97140 MANUAL THERAPY 1/> REGIONS: CPT

## 2020-09-02 NOTE — PROGRESS NOTES
Jose Pond  : 1990  Primary: [address]  Secondary:  [address]  at [facility]  [address], [address], [place] [address]  [phone]   [phone]      OUTPATIENT PHYSICAL THERAPY: Daily Treatment Note 2020  ICD-10: Treatment Diagnosis: N39.46 Mixed incontinence (Urge and stress incontinence), R27.8 Lack of coordination (muscle incoordination)   Precautions/Allergies:   Patient has no allergy information on record. TREATMENT PLAN:  Effective Dates: 2020 TO 2020 (90 days). Frequency/Duration: 1 time a week for 90 Day(s) MEDICAL/REFERRING DIAGNOSIS:  Mixed incontinence [N39.46]   DATE OF ONSET:   REFERRING PHYSICIAN: Diamond Barron MD MD Orders: evaluate and treat  Return MD Appointment: September (telehealth)     Pre-treatment Symptoms/Complaints:  Patient reports she is still status quo. Urgency is still their. Had one episode of leakage. Is seeing a back specialist tomorrow for back pain that is not going away. Yoga and stretches are keeping it status quo and not making it better. Pain: Initial:   0 Post Session:  0/10   Medications Last Reviewed:  2020   Updated Objective Findings:  see below   Ms. Maranda Spicer is a 26 yo female referred to pelvic floor physical therapy (PFPT) by Diamond Barron MD  mixed incontinence. Pt reports that symptoms began 2019. She doesn't know how it started. Reports she can do physical activity and its fine its when she has to jump or jump rope. Happens with sneezing not coughing. If she suddenly gets the urge to go it will happen on the way to the bathroom. The urge hits pretty hard. Leakage is more pronounced in the afternoon. Has a lot more stress in her life right now with a career change.      Urinary: Frequency 9-12 x/day, 0 x/night. Positive for stress urinary incontinence, urge urinary incontinence, urinary urgency and urinary frequency.  Negative for incomplete emptying, urinary hesitancy/intermittency, dysuria, hematuria and nocturia. Pt does not use pads for protection; 0 pads per day (PPD). Fluid intake: 1 gallon water/day; bladder irritants include: herbal/green tea in am  Bowel: Frequency 2-3 times a day. Positive for none. Negative for pain with bowel movement, pushing/straining with bowel movement, fecal incontinence, constipation and incomplete emptying. Sexual: Pt is sexually active with male partners. Contraception/birth control: sprintex. negative for dyspareunia. Pelvic Organ Prolapse/Pelvic Pain: Location: none. OB History: none    External Observation:   · Voluntary Contraction: present  · Voluntary Relaxation: present, dealyed  · Vaginal Vault Size: within normal limits     PALPATION:  increased tone throughout PFM, no pain     RANGE OF MOTION:  Decreased due to tone     STRENGTH:  P: Power, E: Endurance, R: Repetitions, QF: Quick Flicks, TrA: Transverse Abdominus  P 2   E 1   R 3   QF     TrA        COORDINATION:  PFM Coordination: decreased  Diaphragmatic breathing (DB): able to perform with cues     TREATMENT:   THERAPEUTIC EXERCISE: (0 minutes):  Exercises per grid below to improve mobility, strength and coordination. Required minimal visual, verbal and manual cues to promote proper body alignment, promote proper body posture and promote proper body mechanics. Progressed resistance, range and repetitions as indicated. All exercises performed with emphasis on kegel and breathing in order improve coordination, awareness and to minimize symptoms.     Date:  6-4-20 Date:  6-30-20 Date:  7-7-20 Date:  7-15-20 Date:  7-23-20 Date:  8-5-20 Date:  8-12-20 Date:  8-19-20   Activity/Exercise Parameters Parameters Parameters        Patient Education Discussed HEP and POC      Discussed bladder diary, yoga stretches for low back    breathing reviewed            drops reviewed            LTR          10 sec hold at end ranges x 10   SKTC     HEP   30 sec hold x 3     Figure 4     HEP   30 sec hold x 3  30 sec hold x 3   butterfly     HEP   30 sec hold x 3     bridge  x20   x20 blue band x20 blue band x20 blue band      clamshell  x20   x20 blue band x20 blue band x20 blue band      Quadruped  x20   x20        Sit to stand    x20 x20 blue band x20 blue band x20 blue band      Rows     x20 blue band x20 blue band x20 blue band   x20 blue band   Extensions   x20 blue band   x20 blue band x20 blue band   x20 blue band   Sidestepping     50 feet x 2   50 feet x 2 green band   Backwards          50 feet x 2 green band      Pt gives verbal consent to internal  assessment/treatment no chaperon present. NEURO REEDUCATION: ( minutes) to improve control and coordination of pelvic floor     Date:  6-22-20 Date:   Date:     Activity/Exercise Parameters Parameters Parameters   Biofeedback With sEMG was utilized for coordination of PFM. Supine, Sitting, Standing                                           MANUAL THERAPY: (30 minutes) to improve soft tissue tone and mobility  Date Type Location Comments   9/2/2020 Internal assessment/treatment Via vaginal canal SP, CTM, drops                                         (Used abbreviations: MET - muscle energy technique; SCS- Strain counter strain; CTM-Connective tissue mobilizations; CR- Contract/relax; SP- Sustained pressure, TrP-Trigger point release, IASTM- Instrument assisted soft tissue mobilizations, TDN-Trigger point dry needling)    THERAPEUTIC ACTIVITY: (0 minutes)   -discussed, reviewed, performed urge suppression      [employer] Portal     Treatment/Session Summary:  Pt reports good understanding of plan of care, as well as prescribed home exercise program.  All questions were answered to pt's satisfaction. Pt was invited to call with any further questions or concerns. · Response to Treatment:  Patient was 30 minutes late to appointment today. She is going to see a back specialist because her back isn't getting better.  This may be effecting her pelvic floor. She does have a lot of stress in her life right now due to buying a house in moving so her PFM are more tight than normal.   · Communication/Consultation:  None today  · Equipment provided today:  None today  · Recommendations/Intent for next treatment session: Next visit will focus on biofeedback, manual therapy, stretches, urge suppression.   Total Treatment Billable Duration:  30 minutes manaul   PT Patient Time In/Time Out  Time In: 1330  Time Out: [address] DPT    Future Appointments   Date Time Provider Everton Fishman   9/10/2020  2:00 PM CURTIS Cheng   9/17/2020  2[facility]

## 2020-09-17 ENCOUNTER — HOSPITAL ENCOUNTER (OUTPATIENT)
Dept: PHYSICAL THERAPY | Age: 30
Discharge: HOME | End: 2020-09-17
Payer: COMMERCIAL

## 2020-09-17 PROCEDURE — 97140 MANUAL THERAPY 1/> REGIONS: CPT

## 2020-09-17 PROCEDURE — 97110 THERAPEUTIC EXERCISES: CPT

## 2020-09-17 NOTE — PROGRESS NOTES
Marta Crawley  : 1990  Primary: [address]  Secondary:  [address]  at [facility]  [address], [address], [place] [address]  [phone]   [phone]      OUTPATIENT PHYSICAL THERAPY: Daily Treatment Note 2020  ICD-10: Treatment Diagnosis: N39.46 Mixed incontinence (Urge and stress incontinence), R27.8 Lack of coordination (muscle incoordination)   Precautions/Allergies:   Patient has no allergy information on record. TREATMENT PLAN:  Effective Dates: 20 to 20 (90 days). Frequency/Duration: 1 time a week for 90 Day(s) MEDICAL/REFERRING DIAGNOSIS:  Mixed incontinence [N39.46]   DATE OF ONSET:   REFERRING PHYSICIAN: Kaylan Fitzgerald MD MD Orders: evaluate and treat  Return MD Appointment: -     Pre-treatment Symptoms/Complaints:  Patient reports the urgency is still there but hasn't had frequency. She got an MRI. Has not gotten the results. Only has leakage with the urgency. Pain: Initial:   0 Post Session:  0/10   Medications Last Reviewed:  2020   Updated Objective Findings:  see below   Ms. Shaka Segal is a 28 yo female referred to pelvic floor physical therapy (PFPT) by Kaylan Fitzgerald MD / mixed incontinence. Pt reports that symptoms began 2019. She doesn't know how it started. Reports she can do physical activity and its fine its when she has to jump or jump rope. Happens with sneezing not coughing. If she suddenly gets the urge to go it will happen on the way to the bathroom. The urge hits pretty hard. Leakage is more pronounced in the afternoon. Has a lot more stress in her life right now with a career change.      Urinary: Frequency 9-12 x/day, 0 x/night. Positive for stress urinary incontinence, urge urinary incontinence, urinary urgency and urinary frequency. Negative for incomplete emptying, urinary hesitancy/intermittency, dysuria, hematuria and nocturia.  Pt does not use pads for protection; 0 pads per day (PPD). Fluid intake: 1 gallon water/day; bladder irritants include: herbal/green tea in am  Bowel: Frequency 2-3 times a day. Positive for none. Negative for pain with bowel movement, pushing/straining with bowel movement, fecal incontinence, constipation and incomplete emptying. Sexual: Pt is sexually active with male partners. Contraception/birth control: sprintex. negative for dyspareunia. Pelvic Organ Prolapse/Pelvic Pain: Location: none. OB History: none    External Observation:   · Voluntary Contraction: present  · Voluntary Relaxation: present, dealyed  · Vaginal Vault Size: within normal limits     PALPATION:  increased tone throughout PFM, no pain     RANGE OF MOTION:  Decreased due to tone     STRENGTH:  P: Power, E: Endurance, R: Repetitions, QF: Quick Flicks, TrA: Transverse Abdominus  P 3   E 1   R 3   QF     TrA        COORDINATION:  PFM Coordination: decreased       TREATMENT:   THERAPEUTIC EXERCISE: (15 minutes):  Exercises per grid below to improve mobility, strength and coordination. Required minimal visual, verbal and manual cues to promote proper body alignment, promote proper body posture and promote proper body mechanics. Progressed resistance, range and repetitions as indicated. All exercises performed with emphasis on kegel and breathing in order improve coordination, awareness and to minimize symptoms.     Date:  6-4-20 Date:  6-30-20 Date:  7-7-20 Date:  7-15-20 Date:  7-23-20 Date:  8-5-20 Date:  8-12-20 Date:  8-19-20 Date:  9-17-20   Activity/Exercise Parameters Parameters Parameters         Patient Education Discussed HEP and POC      Discussed bladder diary, yoga stretches for low back     breathing reviewed             drops reviewed             LTR          10 sec hold at end ranges x 10    SKTC     HEP   30 sec hold x 3      Figure 4     HEP   30 sec hold x 3  30 sec hold x 3    butterfly     HEP   30 sec hold x 3      bridge  x20   x20 blue band x20 blue band x20 blue band       clamshell  x20   x20 blue band x20 blue band x20 blue band       Quadruped  x20   x20         Sit to stand    x20 x20 blue band x20 blue band x20 blue band       Rows     x20 blue band x20 blue band x20 blue band   x20 blue band x20 blue band   Extensions   x20 blue band   x20 blue band x20 blue band   x20 blue band x20 blue band   Sidestepping     50 feet x 2   50 feet x 2 green band 50 feet x 2 green band   Backwards          50 feet x 2 green band 50 feet x 2 green band      Pt gives verbal consent to internal  assessment/treatment no chaperon present. NEURO REEDUCATION: ( minutes) to improve control and coordination of pelvic floor     Date:  6-22-20 Date:   Date:     Activity/Exercise Parameters Parameters Parameters   Biofeedback With sEMG was utilized for coordination of PFM. Supine, Sitting, Standing                                           MANUAL THERAPY: (45 minutes) to improve soft tissue tone and mobility  Date Type Location Comments   9/17/2020 Internal assessment/treatment Via vaginal canal SP, CTM, drops                                         (Used abbreviations: MET - muscle energy technique; SCS- Strain counter strain; CTM-Connective tissue mobilizations; CR- Contract/relax; SP- Sustained pressure, TrP-Trigger point release, IASTM- Instrument assisted soft tissue mobilizations, TDN-Trigger point dry needling)    THERAPEUTIC ACTIVITY: (0 minutes)   -discussed, reviewed, performed urge suppression      [employer] Portal     Treatment/Session Summary:  Pt reports good understanding of plan of care, as well as prescribed home exercise program.  All questions were answered to pt's satisfaction. Pt was invited to call with any further questions or concerns.   · Response to Treatment:  Patient see recert  · Communication/Consultation:  None today  · Equipment provided today:  None today  · Recommendations/Intent for next treatment session: Next visit will focus on biofeedback, manual therapy, stretches, urge suppression.   Total Treatment Billable Duration:  45 minutes manual, 10 minutes there ex   PT Patient Time In/Time Out  Time In: 1400  Time Out: [address], DPT    Future Appointments   Date Time Provider Everton Fishman   9/24/2020  1:00 PM ARISTIDES MárquezT [facility]

## 2020-09-17 NOTE — THERAPY RECERTIFICATION
Jacque Mckeon  : 1990  Primary: [address]  Secondary:  [address]  at [facility]  [address] , [address], [place] [address]  [phone]   [phone]       OUTPATIENT PHYSICAL THERAPY:Recertification [id number]   ICD-10: Treatment Diagnosis: N39.46 Mixed incontinence (Urge and stress incontinence), R27.8 Lack of coordination (muscle incoordination)   Precautions/Allergies:   Patient has no allergy information on record. TREATMENT PLAN:  Effective Dates: 20 to 20 (90 days). Frequency/Duration: 1 time a week for 90 Day(s) MEDICAL/REFERRING DIAGNOSIS:  Mixed incontinence [N39.46]   DATE OF ONSET:   REFERRING PHYSICIAN: Magda Harding MD MD Orders: evaluate and treat  Return MD Appointment: -    REASSESSMENT: Ms. Alize Louis has been seen in skilled PT from 20 to 20. Patient has attended 11 out of 14 scheduled visits, with 3 cancellation(s) and 0 no shows. Treatment has emphasized manual therapy, down training, stretching, and coordination of PFM. Patient has made improvements in coordination of PFM leading to decreased frequency, however continues to demonstrate deficits in muscles tightness contributing to mixed urinary incontinence. Patient also has back pain and recently got an MRI on her lumbar spine. She may need to address her lumbar spine issues as well as pelvic floor. Pt has progressed with goals as indicated below at this point and will continue to benefit from skilled PT in order to achieve remaining goals and maximize functional outcomes in order to return to [facility]. INITIAL ASSESSMENT:  Ms. Alize Louis presents with pelvic floor muscle over activity, lack of coordination, endurance and strength. She has a lot of stress in her life right now which could be contributing to the over activity. Patient would benefit from skilled physical therapy to address her deficits and maximize her function.      PROBLEM LIST (Impacting functional limitations):  1. Decreased Strength  2. Decreased Flexibility/Joint Mobility  3. Decreased Dare with Home Exercise Program  4. Decreased coordination INTERVENTIONS PLANNED:  1. Family Education  2. Home Exercise Program (HEP)  3. Manual Therapy  4. Neuromuscular Re-education/Strengthening  5. Therapeutic Activites  6. Therapeutic Exercise/Strengthening     GOALS: (Goals have been discussed and agreed upon with patient.)  1. Patient will verbalize an understanding of pelvic anatomy and causes of incontinence MET  2. Patient will demonstrate I with basic PFM HEP to improve awareness, coordination, and timing of PFM CONTINUE  3. Patient will demonstrate \"the knack\" to eliminate UI during cough/sneeze CONTINUE  4. Patient will demonstrate 10 quick flicks of the pelvic floor muscle group, without compensation, to implement urge suppression appropriately with urgency of urination and decrease the number of pads used per day. CONTINUE  5. Pt with demonstrate normal voluntary relaxation of the pelvic floor muscle group to improve pelvic floor ROM and decrease pain. CONTINUE  6. Pt will independently perform proper toilet position CONTINUE      Urinary: Frequency 9-12 x/day, 0 x/night. Positive for stress urinary incontinence, urge urinary incontinence, urinary urgency and urinary frequency. Negative for incomplete emptying, urinary hesitancy/intermittency, dysuria, hematuria and nocturia. Pt does not use pads for protection; 0 pads per day (PPD). Fluid intake: 1 gallon water/day; bladder irritants include: herbal/green tea in am  Bowel: Frequency 2-3 times a day. Positive for none. Negative for pain with bowel movement, pushing/straining with bowel movement, fecal incontinence, constipation and incomplete emptying. Sexual: Pt is sexually active with male partners. Contraception/birth control: sprintex. negative for dyspareunia. Pelvic Organ Prolapse/Pelvic Pain: Location: none.   OB History: none    External Observation:   · Voluntary Contraction: present  · Voluntary Relaxation: present, dealyed  · Vaginal Vault Size: within normal limits     PALPATION:  increased tone throughout PFM, no pain     RANGE OF MOTION:  Decreased due to tone     STRENGTH:  P: Power, E: Endurance, R: Repetitions, QF: Quick Flicks, TrA: Transverse Abdominus  P 3   E 1   R 3   QF     TrA        COORDINATION:  PFM Coordination: decreased      Outcome Measure:   Pelvic Floor Impact Questionnaire (PFIQ-7)  Score (out of 300) Initial:   Bladder/urinary: 47  Bowel/rectum: 0  Vagina/pelvis: 0  Total: 47 Most Recent:   Bladder/urinary: 57  Bowel/rectum: 0  Vagina/pelvis: 9  Total: 47     Interpretation of Score: This survey asks questions concerning certain bowel, bladder, or pelvic symptoms and how much these symptoms interfere with daily activities. Each section is scored on a 0-3 scale, 3 representing the greatest disability. The scores of each section (out of 100) are added together for a total score out of 300. Medical Necessity:   · Patient demonstrates GOOD rehab potential due to higher previous functional level. Reason for Services/Other Comments:  · Patient continues to require skilled intervention due to above mentioned deficits  . Total Duration:   PT Patient Time In/Time Out  Time In: 1400  Time Out: 1500    Rehabilitation Potential For Stated Goals: Good  Regarding [facility] therapy, I certify that the treatment plan above will be carried out by a therapist or under their direction.   Thank you for this referral,  Vandana Martin DPT     Referring Physician Signature: Meño Palomino MD _______________________________ Date _____________

## 2020-09-21 ENCOUNTER — RX ONLY (OUTPATIENT)
Age: 30
Setting detail: RX ONLY
End: 2020-09-21

## 2020-09-21 ENCOUNTER — APPOINTMENT (RX ONLY)
Dept: URBAN - METROPOLITAN AREA CLINIC 23 | Facility: CLINIC | Age: 30
Setting detail: DERMATOLOGY
End: 2020-09-21

## 2020-09-21 DIAGNOSIS — L71.8 OTHER ROSACEA: ICD-10-CM | Status: IMPROVED

## 2020-09-21 PROBLEM — L30.9 DERMATITIS, UNSPECIFIED: Status: ACTIVE | Noted: 2020-09-21

## 2020-09-21 PROCEDURE — ? COUNSELING

## 2020-09-21 PROCEDURE — 99213 OFFICE O/P EST LOW 20 MIN: CPT

## 2020-09-21 PROCEDURE — ? TREATMENT REGIMEN

## 2020-09-21 PROCEDURE — ? ADDITIONAL NOTES

## 2020-09-21 RX ORDER — METRONIDAZOLE 7.5 MG/G
CREAM TOPICAL
Qty: 1 | Refills: 11 | Status: ERX

## 2020-09-21 ASSESSMENT — LOCATION SIMPLE DESCRIPTION DERM
LOCATION SIMPLE: CHIN
LOCATION SIMPLE: LEFT CHEEK
LOCATION SIMPLE: RIGHT CHEEK

## 2020-09-21 ASSESSMENT — LOCATION DETAILED DESCRIPTION DERM
LOCATION DETAILED: LEFT MENTAL CREASE
LOCATION DETAILED: LEFT INFERIOR CENTRAL MALAR CHEEK
LOCATION DETAILED: RIGHT SUPERIOR MEDIAL BUCCAL CHEEK

## 2020-09-21 ASSESSMENT — LOCATION ZONE DERM: LOCATION ZONE: FACE

## 2020-09-21 NOTE — PROCEDURE: ADDITIONAL NOTES
Detail Level: Simple
Additional Notes: Pt inquired about patch testing. This will be ineffective. Pt was encouraged to watch out for triggers such as alcohol, chocolate, toothpaste, and physical exertion.\\n\\nPt was encouraged to continue using metronidazole cream.

## 2020-09-21 NOTE — PROCEDURE: TREATMENT REGIMEN
Plan: Recommended patient use metronidazole for 30 days.
Detail Level: Zone
Discontinue Regimen: Elidel
Initiate Treatment: Metronidazole cream

## 2020-11-16 NOTE — THERAPY DISCHARGE
Augusto Keyes  : 1990  Primary: [address]  Secondary:  [address]  at [address]  [address], [address], [place] [address]  [phone]   [phone]       OUTPATIENT PHYSICAL THERAPY:Discontinuation Summary 2020   ICD-10: Treatment Diagnosis: N39.46 Mixed incontinence (Urge and stress incontinence), R27.8 Lack of coordination (muscle incoordination)   Precautions/Allergies:   Patient has no allergy information on record. MEDICAL/REFERRING DIAGNOSIS:  Mixed incontinence [N39.46]   DATE OF ONSET:   REFERRING PHYSICIAN: Lori French MD MD Orders: evaluate and treat  Return MD Appointment: -    Praful Zarate Ms. Loreto Jordan has been seen in skilled PT from 20 to 20. Patient has attended 11 out of 14 scheduled visits, with 3 cancellation(s) and 0 no shows. Treatment has emphasized manual therapy, down training, stretching, and coordination of PFM. Patient has made improvements in coordination of PFM leading to decreased frequency, however continues to demonstrate deficits in muscles tightness contributing to mixed urinary incontinence. Patient also has back pain and recently got an MRI on her lumbar spine. She may need to address her lumbar spine issues as well as pelvic floor. Pt called and is going to discharge from therapy and work on her low back due to the results of the MRI (20)    INITIAL ASSESSMENT:  Ms. Loreto Jordan presents with pelvic floor muscle over activity, lack of coordination, endurance and strength. She has a lot of stress in her life right now which could be contributing to the over activity. Patient would benefit from skilled physical therapy to address her deficits and maximize her function. GOALS: (Goals have been discussed and agreed upon with patient.)  1. Patient will verbalize an understanding of pelvic anatomy and causes of incontinence MET  2.  Patient will demonstrate I with basic PFM HEP to improve awareness, coordination, and timing of PFM CONTINUE  3. Patient will demonstrate \"the knack\" to eliminate UI during cough/sneeze CONTINUE  4. Patient will demonstrate 10 quick flicks of the pelvic floor muscle group, without compensation, to implement urge suppression appropriately with urgency of urination and decrease the number of pads used per day. CONTINUE  5. Pt with demonstrate normal voluntary relaxation of the pelvic floor muscle group to improve pelvic floor ROM and decrease pain. CONTINUE  6. Pt will independently perform proper toilet position CONTINUE      Urinary: Frequency 9-12 x/day, 0 x/night. Positive for stress urinary incontinence, urge urinary incontinence, urinary urgency and urinary frequency. Negative for incomplete emptying, urinary hesitancy/intermittency, dysuria, hematuria and nocturia. Pt does not use pads for protection; 0 pads per day (PPD). Fluid intake: 1 gallon water/day; bladder irritants include: herbal/green tea in am  Bowel: Frequency 2-3 times a day. Positive for none. Negative for pain with bowel movement, pushing/straining with bowel movement, fecal incontinence, constipation and incomplete emptying. Sexual: Pt is sexually active with male partners. Contraception/birth control: sprintex. negative for dyspareunia. Pelvic Organ Prolapse/Pelvic Pain: Location: none.   OB History: none    External Observation:   · Voluntary Contraction: present  · Voluntary Relaxation: present, dealyed  · Vaginal Vault Size: within normal limits     PALPATION:  increased tone throughout PFM, no pain     RANGE OF MOTION:  Decreased due to tone     STRENGTH:  P: Power, E: Endurance, R: Repetitions, QF: Quick Flicks, TrA: Transverse Abdominus  P 3   E 1   R 3   QF     TrA        COORDINATION:  PFM Coordination: decreased      Outcome Measure:   Pelvic Floor Impact Questionnaire (PFIQ-7)  Score (out of 300) Initial:   Bladder/urinary: 47  Bowel/rectum: 0  Vagina/pelvis: 0  Total: 47 Most Recent:   Bladder/urinary: 57  Bowel/rectum: 0  Vagina/pelvis: 9  Total: 47     Interpretation of Score: This survey asks questions concerning certain bowel, bladder, or pelvic symptoms and how much these symptoms interfere with daily activities. Each section is scored on a 0-3 scale, 3 representing the greatest disability. The scores of each section (out of 100) are added together for a total score out of 300. Regarding [facility] therapy, I certify that the treatment plan above will be carried out by a therapist or under their direction.   Thank you for this referral,  Jenna Campbell DPT     Referring Physician Signature: Tori Rodriguez MD _______________________________ Date _____________

## 2020-11-20 ENCOUNTER — RX ONLY (OUTPATIENT)
Age: 30
Setting detail: RX ONLY
End: 2020-11-20

## 2020-11-20 RX ORDER — IVERMECTIN 10 MG/G
CREAM TOPICAL
Qty: 1 | Refills: 0 | Status: ERX | COMMUNITY
Start: 2020-11-20

## 2020-11-24 ENCOUNTER — RX ONLY (OUTPATIENT)
Age: 30
Setting detail: RX ONLY
End: 2020-11-24

## 2020-11-24 RX ORDER — IVERMECTIN 10 MG/G
CREAM TOPICAL
Qty: 1 | Refills: 2 | Status: ERX

## 2020-12-15 ENCOUNTER — APPOINTMENT (RX ONLY)
Dept: URBAN - METROPOLITAN AREA CLINIC 349 | Facility: CLINIC | Age: 30
Setting detail: DERMATOLOGY
End: 2020-12-15

## 2020-12-15 DIAGNOSIS — L71.8 OTHER ROSACEA: ICD-10-CM

## 2020-12-15 PROCEDURE — ? PRESCRIPTION

## 2020-12-15 PROCEDURE — ? TREATMENT REGIMEN

## 2020-12-15 PROCEDURE — 99202 OFFICE O/P NEW SF 15 MIN: CPT

## 2020-12-15 PROCEDURE — ? OTHER

## 2020-12-15 PROCEDURE — ? COUNSELING

## 2020-12-15 RX ORDER — MINOCYCLINE HYDROCHLORIDE 50 MG/1
CAPSULE ORAL
Qty: 60 | Refills: 1 | Status: ERX

## 2020-12-15 RX ORDER — MINOCYCLINE 15 MG/G
AEROSOL, FOAM TOPICAL
Qty: 1 | Refills: 2 | Status: ERX

## 2020-12-15 ASSESSMENT — LOCATION SIMPLE DESCRIPTION DERM
LOCATION SIMPLE: LEFT CHEEK
LOCATION SIMPLE: RIGHT CHEEK

## 2020-12-15 ASSESSMENT — LOCATION DETAILED DESCRIPTION DERM
LOCATION DETAILED: LEFT CENTRAL MALAR CHEEK
LOCATION DETAILED: RIGHT CENTRAL MALAR CHEEK

## 2020-12-15 ASSESSMENT — LOCATION ZONE DERM: LOCATION ZONE: FACE

## 2020-12-15 NOTE — PROCEDURE: TREATMENT REGIMEN
Continue Regimen: Vanicream products
Initiate Treatment: Minocycline 50 mg daily x 1 month\\nZilxi applied twice daily prior to moisturizer
Otc Regimen: Aveeno positively radiant moisturizer \\nEucerin anti Redness cleanser and moisturizer \\nCerave products
Detail Level: Zone
Discontinue Regimen: Metrocream

## 2020-12-15 NOTE — HPI: RASH (ROSACEA)
How Severe Is Your Rosacea?: moderate
Is This A New Presentation, Or A Follow-Up?: Rash
Additional History: Has seen another dermatologist and was treated with metro cream and using vanicream products.  Originally was treated with doxycycline for a few days but stopped after developing virus infection.   Ophthalmologist said she had ocular rosacea.  Initially treated for brian oral dermatitis and was given Elidel which helped but when she stopped her face flared.

## 2020-12-15 NOTE — PROCEDURE: OTHER
Other (Free Text): Has had allergy testing to outdoor allergens.\\nHas done some dietary changes. Trying to determine what her trigger factors are.\\nLong discussion about the stage of her rosacea, Dr. phillips considered it mild.\\nDiscussed difference between treatments of different cream.\\nNo retinoids
Note Text (......Xxx Chief Complaint.): This diagnosis correlates with the
Detail Level: Simple
Other (Free Text): Currently using Sustain eye drops

## 2021-01-15 ENCOUNTER — RX ONLY (OUTPATIENT)
Age: 31
Setting detail: RX ONLY
End: 2021-01-15

## 2021-01-15 ENCOUNTER — APPOINTMENT (RX ONLY)
Dept: URBAN - METROPOLITAN AREA CLINIC 349 | Facility: CLINIC | Age: 31
Setting detail: DERMATOLOGY
End: 2021-01-15

## 2021-01-15 DIAGNOSIS — L71.8 OTHER ROSACEA: ICD-10-CM | Status: RESOLVING

## 2021-01-15 PROCEDURE — ? COUNSELING

## 2021-01-15 PROCEDURE — ? TREATMENT REGIMEN

## 2021-01-15 PROCEDURE — 99213 OFFICE O/P EST LOW 20 MIN: CPT

## 2021-01-15 PROCEDURE — ? PRESCRIPTION

## 2021-01-15 PROCEDURE — ? OTHER

## 2021-01-15 RX ORDER — MINOCYCLINE 15 MG/G
AEROSOL, FOAM TOPICAL
Qty: 1 | Refills: 2 | Status: ERX

## 2021-01-15 RX ORDER — IVERMECTIN 10 MG/G
CREAM TOPICAL
Qty: 1 | Refills: 6 | Status: ERX

## 2021-01-15 ASSESSMENT — LOCATION DETAILED DESCRIPTION DERM
LOCATION DETAILED: RIGHT CENTRAL MALAR CHEEK
LOCATION DETAILED: LEFT CENTRAL MALAR CHEEK

## 2021-01-15 ASSESSMENT — LOCATION ZONE DERM: LOCATION ZONE: FACE

## 2021-01-15 ASSESSMENT — LOCATION SIMPLE DESCRIPTION DERM
LOCATION SIMPLE: LEFT CHEEK
LOCATION SIMPLE: RIGHT CHEEK

## 2021-01-15 NOTE — PROCEDURE: OTHER
Other (Free Text): Has had allergy testing to outdoor allergens.\\nPatient states she has done some dietary changes,Trying to determine what her trigger factors are.\\nLong discussion about the stage of her rosacea, Dr. phillips considered it mild.\\nDiscussed difference between treatments of different creams, foams\\nNo retinoids still\\nPatient expressed interest in skin care, discussed she may want to talk to  about skin care .
Note Text (......Xxx Chief Complaint.): This diagnosis correlates with the
Detail Level: Simple

## 2021-01-15 NOTE — PROCEDURE: TREATMENT REGIMEN
Plan: Offered other topicals so she doesn’t have to yellowness
Continue Regimen: Vanicream products, Minocycline 50 mg , decrease to once daily , unless she’s flaring, then she can restart at twice daily again. Zilxi use a smaller amount to prevent yellow discoloration, if she decides to continue. She was given Soolantra rx also qam
Otc Regimen: Aveeno positively radiant moisturizer \\nEucerin anti Redness cleanser and moisturizer \\nCerave products
Detail Level: Zone
Discontinue Regimen: Metrocream

## 2021-01-25 ENCOUNTER — APPOINTMENT (RX ONLY)
Dept: URBAN - METROPOLITAN AREA CLINIC 349 | Facility: CLINIC | Age: 31
Setting detail: DERMATOLOGY
End: 2021-01-25

## 2021-01-25 DIAGNOSIS — L71.8 OTHER ROSACEA: ICD-10-CM | Status: INADEQUATELY CONTROLLED

## 2021-01-25 PROCEDURE — ? PRESCRIPTION

## 2021-01-25 PROCEDURE — ? OTHER

## 2021-01-25 PROCEDURE — 99213 OFFICE O/P EST LOW 20 MIN: CPT

## 2021-01-25 PROCEDURE — ? COUNSELING

## 2021-01-25 PROCEDURE — ? TREATMENT REGIMEN

## 2021-01-25 RX ORDER — IVERMECTIN 10 MG/G
CREAM TOPICAL
Qty: 1 | Refills: 6 | Status: ERX

## 2021-01-25 ASSESSMENT — LOCATION DETAILED DESCRIPTION DERM
LOCATION DETAILED: LEFT CENTRAL MALAR CHEEK
LOCATION DETAILED: RIGHT CENTRAL MALAR CHEEK

## 2021-01-25 ASSESSMENT — LOCATION SIMPLE DESCRIPTION DERM
LOCATION SIMPLE: LEFT CHEEK
LOCATION SIMPLE: RIGHT CHEEK

## 2021-01-25 ASSESSMENT — LOCATION ZONE DERM: LOCATION ZONE: FACE

## 2021-01-25 NOTE — PROCEDURE: TREATMENT REGIMEN
Otc Regimen: Anthelos 60  melt in milk and 100 spf  sampled Anthelos sx was not sampled because we don’t have.
Detail Level: Zone
Discontinue Regimen: Metrocream
Continue Regimen: Vanicream products, Minocycline 50 mg bid, (patient had been on 50 mg qd but called office stating she had a flare and was instructed to increase to bid ) Raine madison, Soolantra qam, discussed patch testing patient seems interested in doing that, patient counseled to stop taking antihistamine prior to patch testing.
Plan: Offered other topicals so she doesn’t have to yellowness

## 2021-02-01 ENCOUNTER — APPOINTMENT (RX ONLY)
Dept: URBAN - METROPOLITAN AREA CLINIC 349 | Facility: CLINIC | Age: 31
Setting detail: DERMATOLOGY
End: 2021-02-01

## 2021-02-01 DIAGNOSIS — L30.9 DERMATITIS, UNSPECIFIED: ICD-10-CM | Status: INADEQUATELY CONTROLLED

## 2021-02-01 DIAGNOSIS — L71.8 OTHER ROSACEA: ICD-10-CM | Status: STABLE

## 2021-02-01 PROCEDURE — 95044 PATCH/APPLICATION TESTS: CPT

## 2021-02-01 PROCEDURE — 99213 OFFICE O/P EST LOW 20 MIN: CPT | Mod: 25

## 2021-02-01 PROCEDURE — ? PATCH TESTING

## 2021-02-01 PROCEDURE — ? COUNSELING

## 2021-02-01 PROCEDURE — ? PRESCRIPTION

## 2021-02-01 RX ORDER — IVERMECTIN 10 MG/G
CREAM TOPICAL
Qty: 1 | Refills: 6 | Status: CANCELLED
Stop reason: CLARIF

## 2021-02-01 ASSESSMENT — LOCATION DETAILED DESCRIPTION DERM
LOCATION DETAILED: INFERIOR THORACIC SPINE
LOCATION DETAILED: LEFT INFERIOR OCCIPITAL SCALP
LOCATION DETAILED: LEFT CENTRAL MALAR CHEEK
LOCATION DETAILED: SUPERIOR THORACIC SPINE
LOCATION DETAILED: RIGHT CENTRAL MALAR CHEEK

## 2021-02-01 ASSESSMENT — LOCATION SIMPLE DESCRIPTION DERM
LOCATION SIMPLE: RIGHT CHEEK
LOCATION SIMPLE: SCALP
LOCATION SIMPLE: LEFT CHEEK
LOCATION SIMPLE: UPPER BACK

## 2021-02-01 ASSESSMENT — LOCATION ZONE DERM
LOCATION ZONE: FACE
LOCATION ZONE: TRUNK
LOCATION ZONE: SCALP

## 2021-02-01 NOTE — PROCEDURE: PATCH TESTING
Post-Care Instructions: I reviewed with the patient in detail post-care instructions. Patient should not sweat, pick at, or get the patches wet for 48 hours.
Consent: Written consent obtained, risks reviewed including but not limited to rash, itching, allergic reaction, systemic rash, remote possiblity of anaphylaxis to allergen.
Detail Level: Generalized
Number Of Patches (Maximum Allowable Per Dos By Cms Is 90): 36

## 2021-02-03 ENCOUNTER — APPOINTMENT (RX ONLY)
Dept: URBAN - METROPOLITAN AREA CLINIC 349 | Facility: CLINIC | Age: 31
Setting detail: DERMATOLOGY
End: 2021-02-03

## 2021-02-03 DIAGNOSIS — L71.8 OTHER ROSACEA: ICD-10-CM

## 2021-02-03 DIAGNOSIS — L30.9 DERMATITIS, UNSPECIFIED: ICD-10-CM | Status: INADEQUATELY CONTROLLED

## 2021-02-03 PROCEDURE — 99214 OFFICE O/P EST MOD 30 MIN: CPT | Mod: 25

## 2021-02-03 PROCEDURE — ? OTHER

## 2021-02-03 PROCEDURE — 95044 PATCH/APPLICATION TESTS: CPT

## 2021-02-03 PROCEDURE — ? PATCH TESTING

## 2021-02-03 PROCEDURE — ? PATCH TEST REMOVAL

## 2021-02-03 PROCEDURE — ? COUNSELING

## 2021-02-03 PROCEDURE — ? TREATMENT REGIMEN

## 2021-02-03 PROCEDURE — ? PRESCRIPTION

## 2021-02-03 ASSESSMENT — LOCATION SIMPLE DESCRIPTION DERM
LOCATION SIMPLE: LEFT UPPER BACK
LOCATION SIMPLE: RIGHT CHEEK
LOCATION SIMPLE: UPPER BACK
LOCATION SIMPLE: SCALP
LOCATION SIMPLE: LEFT CHEEK

## 2021-02-03 ASSESSMENT — LOCATION ZONE DERM
LOCATION ZONE: TRUNK
LOCATION ZONE: FACE
LOCATION ZONE: SCALP

## 2021-02-03 ASSESSMENT — LOCATION DETAILED DESCRIPTION DERM
LOCATION DETAILED: LEFT INFERIOR OCCIPITAL SCALP
LOCATION DETAILED: LEFT CENTRAL MALAR CHEEK
LOCATION DETAILED: INFERIOR THORACIC SPINE
LOCATION DETAILED: SUPERIOR THORACIC SPINE
LOCATION DETAILED: LEFT SUPERIOR MEDIAL UPPER BACK
LOCATION DETAILED: RIGHT CENTRAL MALAR CHEEK

## 2021-02-03 NOTE — PROCEDURE: PATCH TEST REMOVAL
Patch Test Removal Text: The patch test material was removed from the back today. The patch test reading will be performed at at tomorrow’s appointment. Patient instructed not to shower.
Detail Level: None

## 2021-02-03 NOTE — PROCEDURE: TREATMENT REGIMEN
Detail Level: Zone
Discontinue Regimen: Metrocream
Plan: Offered other topicals so she doesn’t have to yellowness
Continue Regimen: Vanicream products, Minocycline 50 mg qd, patient states she is on 50 mg once daily. Zilix nightly, Soolantra qam, discussed patch testing patient seems interested in doing that, patient counseled to stop taking antihistamine prior to patch testing.
Otc Regimen: Anthelos 60  melt in milk and 100 spf  sampled Anthelos sx was not sampled because we don’t have.

## 2021-02-03 NOTE — PROCEDURE: OTHER
Other (Free Text): Referring patient to [facility] for skin care . Other (Free Text): Referring patient to [facility] for skin care .

## 2021-02-03 NOTE — PROCEDURE: PATCH TESTING
Number Of Patches (Maximum Allowable Per Dos By Cms Is 90): 36
Consent: Written consent obtained, risks reviewed including but not limited to rash, itching, allergic reaction, systemic rash, remote possiblity of anaphylaxis to allergen.
Post-Care Instructions: I reviewed with the patient in detail post-care instructions. Patient should not sweat, pick at, or get the patches wet for 48 hours.
Detail Level: Generalized

## 2021-02-04 ENCOUNTER — APPOINTMENT (RX ONLY)
Dept: URBAN - METROPOLITAN AREA CLINIC 349 | Facility: CLINIC | Age: 31
Setting detail: DERMATOLOGY
End: 2021-02-04

## 2021-02-04 DIAGNOSIS — L60.3 NAIL DYSTROPHY: ICD-10-CM | Status: STABLE

## 2021-02-04 DIAGNOSIS — L30.9 DERMATITIS, UNSPECIFIED: ICD-10-CM | Status: INADEQUATELY CONTROLLED

## 2021-02-04 PROCEDURE — ? TRUE TEST READING

## 2021-02-04 PROCEDURE — 95044 PATCH/APPLICATION TESTS: CPT

## 2021-02-04 PROCEDURE — ? COUNSELING

## 2021-02-04 PROCEDURE — 99212 OFFICE O/P EST SF 10 MIN: CPT | Mod: 25

## 2021-02-04 PROCEDURE — ? PATCH TESTING

## 2021-02-04 ASSESSMENT — LOCATION DETAILED DESCRIPTION DERM
LOCATION DETAILED: RIGHT 4TH TOENAIL
LOCATION DETAILED: LEFT DORSAL GREAT TOE
LOCATION DETAILED: RIGHT DISTAL PLANTAR GREAT TOE
LOCATION DETAILED: SUPERIOR THORACIC SPINE
LOCATION DETAILED: LEFT LATERAL 3RD TOE
LOCATION DETAILED: LEFT 2ND TOENAIL
LOCATION DETAILED: RIGHT DORSAL 3RD TOE
LOCATION DETAILED: LEFT LATERAL 4TH TOE
LOCATION DETAILED: RIGHT DORSAL GREAT TOE
LOCATION DETAILED: RIGHT 2ND TOENAIL
LOCATION DETAILED: LEFT DISTAL PLANTAR GREAT TOE
LOCATION DETAILED: INFERIOR THORACIC SPINE

## 2021-02-04 ASSESSMENT — LOCATION SIMPLE DESCRIPTION DERM
LOCATION SIMPLE: RIGHT 3RD TOE
LOCATION SIMPLE: LEFT 3RD TOE
LOCATION SIMPLE: RIGHT 2ND TOE
LOCATION SIMPLE: LEFT 4TH TOE
LOCATION SIMPLE: RIGHT GREAT TOE
LOCATION SIMPLE: RIGHT 4TH TOE
LOCATION SIMPLE: LEFT 2ND TOE
LOCATION SIMPLE: LEFT GREAT TOE
LOCATION SIMPLE: UPPER BACK

## 2021-02-04 ASSESSMENT — LOCATION ZONE DERM
LOCATION ZONE: TRUNK
LOCATION ZONE: TOENAIL
LOCATION ZONE: TOE

## 2021-02-04 NOTE — PROCEDURE: PATCH TESTING
Number Of Patches (Maximum Allowable Per Dos By Cms Is 90): 36
Post-Care Instructions: I reviewed with the patient in detail post-care instructions. Patient should not sweat, pick at, or get the patches wet for 48 hours.
Consent: Written consent obtained, risks reviewed including but not limited to rash, itching, allergic reaction, systemic rash, remote possiblity of anaphylaxis to allergen.
Detail Level: Generalized

## 2021-02-04 NOTE — PROCEDURE: TRUE TEST READING
Potassium Dichromate: no reaction
Show Allergen Counseling In The Note?: Yes
P-Phenylenediamine Counseling: Handouts given
Detail Level: Zone
Show Negative Results In The Note?: No
What Reading Time Point?: 48 hour
Cl+Me-Isothiazolinone (Garrett Olivera) Counseling: Hand out given
Nickel Sulfate Counseling: Handout given
Nickel Sulfate: 1+
Number Of Patches Read: 36

## 2021-02-15 ENCOUNTER — RX ONLY (OUTPATIENT)
Age: 31
Setting detail: RX ONLY
End: 2021-02-15

## 2021-02-15 RX ORDER — MINOCYCLINE HYDROCHLORIDE 50 MG/1
CAPSULE ORAL
Qty: 60 | Refills: 2 | Status: ERX

## 2021-02-19 ENCOUNTER — RX ONLY (OUTPATIENT)
Age: 31
Setting detail: RX ONLY
End: 2021-02-19

## 2021-02-19 RX ORDER — HYDROCORTISONE VALERATE 2 MG/G
OINTMENT TOPICAL
Qty: 1 | Refills: 0 | Status: ERX

## 2021-03-25 NOTE — PROCEDURE: ADDITIONAL NOTES
Additional Notes: Recommended vanicream, (free and clear). Pt can continue her normal dental hygiene regimen.
Detail Level: Simple
Provider pre-printed instructions given

## 2021-04-15 ENCOUNTER — APPOINTMENT (RX ONLY)
Dept: URBAN - METROPOLITAN AREA CLINIC 349 | Facility: CLINIC | Age: 31
Setting detail: DERMATOLOGY
End: 2021-04-15

## 2021-04-15 ENCOUNTER — RX ONLY (OUTPATIENT)
Age: 31
Setting detail: RX ONLY
End: 2021-04-15

## 2021-04-15 DIAGNOSIS — L81.4 OTHER MELANIN HYPERPIGMENTATION: ICD-10-CM

## 2021-04-15 DIAGNOSIS — L21.8 OTHER SEBORRHEIC DERMATITIS: ICD-10-CM

## 2021-04-15 DIAGNOSIS — L60.3 NAIL DYSTROPHY: ICD-10-CM

## 2021-04-15 DIAGNOSIS — D22 MELANOCYTIC NEVI: ICD-10-CM

## 2021-04-15 DIAGNOSIS — Z12.83 ENCOUNTER FOR SCREENING FOR MALIGNANT NEOPLASM OF SKIN: ICD-10-CM

## 2021-04-15 DIAGNOSIS — L71.8 OTHER ROSACEA: ICD-10-CM

## 2021-04-15 PROBLEM — D22.5 MELANOCYTIC NEVI OF TRUNK: Status: ACTIVE | Noted: 2021-04-15

## 2021-04-15 PROCEDURE — ? OTHER

## 2021-04-15 PROCEDURE — 99214 OFFICE O/P EST MOD 30 MIN: CPT

## 2021-04-15 PROCEDURE — ? COUNSELING

## 2021-04-15 PROCEDURE — ? PRESCRIPTION

## 2021-04-15 RX ORDER — MINOCYCLINE HYDROCHLORIDE 50 MG/1
CAPSULE ORAL
Qty: 60 | Refills: 2 | Status: ERX

## 2021-04-15 ASSESSMENT — LOCATION DETAILED DESCRIPTION DERM
LOCATION DETAILED: RIGHT CENTRAL MALAR CHEEK
LOCATION DETAILED: LEFT DISTAL PLANTAR GREAT TOE
LOCATION DETAILED: INFERIOR THORACIC SPINE
LOCATION DETAILED: LEFT MID-UPPER BACK
LOCATION DETAILED: LEFT INFERIOR OCCIPITAL SCALP
LOCATION DETAILED: LEFT MEDIAL MALAR CHEEK
LOCATION DETAILED: RIGHT 2ND TOENAIL
LOCATION DETAILED: LEFT 2ND TOENAIL
LOCATION DETAILED: LEFT LATERAL 4TH TOE
LOCATION DETAILED: RIGHT 4TH TOENAIL
LOCATION DETAILED: MID POSTERIOR NECK
LOCATION DETAILED: SUPERIOR THORACIC SPINE
LOCATION DETAILED: LEFT CENTRAL MALAR CHEEK
LOCATION DETAILED: LEFT DORSAL GREAT TOE
LOCATION DETAILED: RIGHT DISTAL PLANTAR GREAT TOE
LOCATION DETAILED: LEFT LATERAL 3RD TOE
LOCATION DETAILED: RIGHT DORSAL GREAT TOE
LOCATION DETAILED: RIGHT DORSAL 3RD TOE

## 2021-04-15 ASSESSMENT — LOCATION SIMPLE DESCRIPTION DERM
LOCATION SIMPLE: RIGHT 4TH TOE
LOCATION SIMPLE: POSTERIOR NECK
LOCATION SIMPLE: RIGHT GREAT TOE
LOCATION SIMPLE: RIGHT 3RD TOE
LOCATION SIMPLE: SCALP
LOCATION SIMPLE: UPPER BACK
LOCATION SIMPLE: LEFT CHEEK
LOCATION SIMPLE: LEFT UPPER BACK
LOCATION SIMPLE: RIGHT CHEEK
LOCATION SIMPLE: LEFT 2ND TOE
LOCATION SIMPLE: LEFT GREAT TOE
LOCATION SIMPLE: LEFT 3RD TOE
LOCATION SIMPLE: RIGHT 2ND TOE
LOCATION SIMPLE: LEFT 4TH TOE

## 2021-04-15 ASSESSMENT — LOCATION ZONE DERM
LOCATION ZONE: TRUNK
LOCATION ZONE: FACE
LOCATION ZONE: SCALP
LOCATION ZONE: NECK
LOCATION ZONE: TOENAIL
LOCATION ZONE: TOE

## 2021-04-15 NOTE — PROCEDURE: OTHER
Other (Free Text): Dr phillips recommend for pt to go down to taking one pill a day.
Note Text (......Xxx Chief Complaint.): This diagnosis correlates with the
Render Risk Assessment In Note?: yes
Detail Level: Generalized

## 2021-04-27 ENCOUNTER — HOSPITAL ENCOUNTER (OUTPATIENT)
Dept: PHYSICAL THERAPY | Age: 31
Discharge: HOME | End: 2021-04-27
Payer: COMMERCIAL

## 2021-04-27 PROCEDURE — 97161 PT EVAL LOW COMPLEX 20 MIN: CPT

## 2021-04-27 PROCEDURE — 97110 THERAPEUTIC EXERCISES: CPT

## 2021-04-27 NOTE — THERAPY EVALUATION
[facility] : 1990 Primary: [address] [facility] Secondary:  Therapy Center at [facility] 
[address] [place] [address] [phone]   [phone] OUTPATIENT PHYSICAL THERAPY:Initial Assessment 2021 ICD-10: Treatment Diagnosis: R27.8 Lack of coordination (muscle incoordination), R35.0 Frequency of micturition, N39.46 Mixed incontinence (Urge and stress incontinence), R39.15 Urgency of Urination Excludes1: urge incontinence (N39.41,N39.46) Precautions/Allergies:  
Patient has no allergy information on record. TREATMENT PLAN: 
Effective Dates: 2021 TO 2021 (90 days). Frequency/Duration: 1 time a week for 90 Day(s) MEDICAL/REFERRING DIAGNOSIS: 
Mixed incontinence [N39.46] DATE OF ONSET:  REFERRING PHYSICIAN: Linda Remy MD Orders: evaluate and treat Return MD Appointment: -  
 
INITIAL ASSESSMENT:  Ms. Yao Gunter presents with pelvic floor muscle over activity,  lack of coordination, timing, awareness, endurance and strength. Patient is unable to isloate pelvic floor muscles from abdominals, contributing to over activity and incontinence. She also has a history of core and hip weakness from bulging discs in her low back resulting in further over activity of PFM. Patient would benefit from skilled physical therapy to address her deficits and maximize her function. PROBLEM LIST (Impacting functional limitations): 
Decreased Flexibility/Joint Mobility Decreased Haines with Home Exercise Program 
Decreaesd coordination Decreased strength of pelvic floor which limits bladder control INTERVENTIONS PLANNED: 
1. Family Education 2. Home Exercise Program (HEP) 3. Manual Therapy 4. Neuromuscular Re-education/Strengthening 5. Therapeutic Activites 6. Therapeutic Exercise/Strengthening GOALS: (Goals have been discussed and agreed upon with patient.) Short-Term Functional Goals: Time Frame: 1.  Pt will demonstrate I with basic PFM HEP to improve awareness, coordination, and timing of PFM. 2. Pt will demonstrate understanding of and ability to teach back appropriate water intake, bladder irritants, toileting frequency, and positioning for improved self-management of symptoms. 3. Pt will demonstrate ability to perform diaphragmatic breathing and quick flick pelvic floor contractions in order to implement urge suppression and reduce episodes of UI. 4. Pt will demonstrate ability to perform a coordinate PFM and TA contraction during exhalation for improve coordination with functional tasks. Discharge Goals: Time Frame: 1. Pt will demonstrate ability to voluntarily contract the pelvic floor muscles prior to a cough or sneeze for decreased leakage during increases in intra-abdominal pressure. 2. Pt will demonstrate independence with an advanced HEP for general conditioning, core stabilization, and mobility to facilitate carry over and independent management of symptoms. OUTCOME MEASURE:  
Tool Used: Pelvic Floor Impact Questionnaireshort form 7 (PFIQ-7) Score:  Initial: · Bladder or Urine: 47/100 · Bowel or Rectum: 0/100 · Vagina or Pelvis: 0/100 Most Recent: X (Date: -- ) · Bladder or Urine: X 
· Bowel or Rectum: X · Vagina or Pelvis: X Interpretation of Score: Each of the 7 sections is scored on a scale from 0-3; 0 representing \"Not at all\", 3 representing \"Quite a bit\". The mean value is taken from all the answered items, then multiplied by 100 to obtain the scale score, ranging from 0-100. This process is repeated for each column representing bowel, bladder, and pelvic pain. Medical Necessity:  
· Patient demonstrates GOOD rehab potential due to higher previous functional level. Reason for Services/Other Comments: 
· Patient continues to require skilled intervention due to above mentioned deficits Sandra Marte Total Duration: PT Patient Time In/Time Out Time In: 1400 Time Out: 1500 Rehabilitation Potential For Stated Goals: Good Regarding [facility] therapy, I certify that the treatment plan above will be carried out by a therapist or under their direction. Thank you for this referral, 
Claudeen Albee, DPT Referring Physician Signature: Jace Talbert,* _______________________________ Date _____________ PAIN/SUBJECTIVE:  
Initial:   0 Post Session:  0/10 HISTORY:  
History of Present Injury/Illness (Reason for Referral): Ms. Justin Dickerson is a 31 yo female referred to pelvic floor physical therapy (PFPT) by Jace Talbert,* 2/2 mixed incontinence. Pt reports that symptoms began 2019. Patient was seen in physical therapy in the summer/fall of 2020. She did back PT after pelvic floor. She has two bulging discs L4-5 with mild arthritis. She still has back pain but has her exercises to do. She found that she is extremely really weak. Also, muscles are tense and tight. She still had the incontinence issues while doing back PT. As the back pain is more prominent her incontinence is worse. She still has trouble making it to the bathroom as well as issues with sneezing. Still has sudden urges to urinate. She is trying to do relaxation at home but having a hard time coordinating it with strengthening. She just started incorporating yoga into her routine. Urinary: Frequency 12-14 x/day, 0 x/night. Positive for stress urinary incontinence, urge urinary incontinence, urinary urgency, urinary frequency and incomplete emptying. Negative for urinary hesitancy/intermittency, dysuria, hematuria, nocturia and nocturnal enuresis. Pt does not use pads for urinary protection; 0 pads per day (PPD). Fluid intake:  oz water/day; bladder irritants include: coffee in am, sometimes tea in afternoon. Pt does not limit fluid intake due to bladder control. Bowel: Frequency once a day. Positive for none.  Negative for pain with bowel movement, pushing/straining with bowel movement, fecal incontinence, constipation and incomplete emptying. Pt does not use pads for bowel protection; 0 pads per day (PPD). Use of stool softeners or laxatives? NO Sexual: Pt is sexually active with male partners. negative for dyspareunia. Contraception/birth control: yes. Pelvic Organ Prolapse/Pelvic Pain: Location: none. OB History: Number of pregnancies: 0 Number of vaginal deliveries: 0 Number of c-sections: 0. Past Medical History/Comorbidities: Ms. Oren Gonsales  has no past medical history on file. Ms. Oren Gonsales  has no past surgical history on file. Social History/Living Environment:  
 lives with boyfriend Have you ever had any pelvic trauma (orthopedic in nature, fall, MVA, etc.)? NO Have you ever experienced any unwanted physical or sexual contact? NO Have you ever experienced any form of medical trauma (GYN, urological, GI, etc)? NO Prior Level of Function/Work/Activity: 
Prior level of function: independent Occupation: [email or web address] Exercise activities: yoga, walking, weight training Personal Factors:   
      Sex:  female Age:  32 y.o. Ambulatory/Rehab Services H2 Model Falls Risk Assessment Risk Factors: 
     No Risk Factors Identified Ability to Rise from Chair: 
     (0)  Ability to rise in a single movement Falls Prevention Plan: No modifications necessary Total: (5 or greater = High Risk): 0  
 ©2010 [facility] [address] [place] [address] States Patent #[id number]. Federal Law prohibits the replication, distribution or use without written permission from [facility] Current Medications: No current outpatient medications on file. Date Last Reviewed:  4/27/2021 Number of Personal Factors/Comorbidities that affect the Plan of Care: 0: LOW COMPLEXITY EXAMINATION:  
OBSERVATION:  
External Observations:  Voluntary contraction: [] absent     [x] present  Involuntary contraction: [] absent     [x] present  Involuntary relaxation: [] absent     [x] present  Perineal descent at rest: [] absent     [x] present  Perineal descent with bearing: [] absent     [x] present Pelvic Floor Muscle (PFM) Assessment:  Vaginal vault size: [] decreased     [] increased     [x] WNL  Muscle volume: [] decreased     [x] WNL     [] Defect  PFM tension: [] decreased     [x] increased     [] WNL Contraction ability: 
Voluntary contraction: [] absent     [x] weak     [] moderate      [] strong Voluntary relaxation: [] absent     [x] partial     [] complete MMT: 2/5 Quality of contractions: unable to isolate PFM and TA Overflow: [] absent     [] min     [] mod     [] severe / Compensatory mm groups include Tissue laxity test: Anterior wall: [x] minimum     [] moderate     [] severe      [] WNL Posterior wall: [x] minimum     [] moderate     [] severe      [] WNL Palpation: via vaginal canal  
Superficial Pelvic Floor Musculature (PFM): Tender? Intermediate PFM Tender? Deep PFM Tender? Superficial Transverse Perineal [] Right  [] Left  []DNT Deep Transverse Perineal [] Right  [] Left  []DNT Puborectalis [] Right  [] Left  []DNT Ischiocavernosus [] Right  [] Left  []DNT Compressor Urethra [] Right  [] Left  []DNT Pubococcygeus [] Right  [] Left  []DNT Bulbocavernosus [] Right  [] Left  []DNT   Ileococcygeus [] Right  [] Left  []DNT Obturator Internus [] Right  [] Left  []DNT Coccygeus [] Right  [] Left  []DNT Body Structures Involved: 1. Muscles Body Functions Affected: 1. Genitourinary 2. Neuromusculoskeletal 
3. Movement Related Activities and Participation Affected: 1. Self Care 2. Domestic Life 3. Community, Social and [place] Number of elements (examined above) that affect the Plan of Care: 1-2: LOW COMPLEXITY CLINICAL PRESENTATION:  
Presentation: Stable and uncomplicated: LOW COMPLEXITY CLINICAL DECISION MAKING:  
Use of outcome tool(s) and clinical judgement create a POC that gives a: Clear prediction of patient's progress: LOW COMPLEXITY

## 2021-04-27 NOTE — PROGRESS NOTES
Darci Fontaine  : 1990  Primary: [address] at [address]  [phone]   [phone]        OUTPATIENT PHYSICAL THERAPY: Daily Treatment Note 2021  ICD-10: Treatment Diagnosis: R27.8 Lack of coordination (muscle incoordination), R35.0 Frequency of micturition, N39.46 Mixed incontinence (Urge and stress incontinence), R39.15 Urgency of Urination Excludes1: urge incontinence (N39.41,N39.46)     Precautions/Allergies:   Patient has no allergy information on record. TREATMENT PLAN:  Effective Dates: 2021 TO 2021 (90 days). Frequency/Duration: 1 time a week for 90 Day(s) MEDICAL/REFERRING DIAGNOSIS:  Mixed incontinence [N39.46]   DATE OF ONSET:   REFERRING PHYSICIAN: Isai Carmona MD Orders: evaluate and treat  Return MD Appointment: -     Pre-treatment Symptoms/Complaints:  see evaluation  Pain: Initial:   0 Post Session:  0/10   Medications Last Reviewed:  2021   Updated Objective Findings:  See evaluation note from today   TREATMENT:   THERAPEUTIC EXERCISE: (10 minutes):  Exercises per grid below to improve mobility, strength, and coordination. Required minimal visual, verbal, and manual cues to promote proper body alignment, promote proper body posture, and promote proper body mechanics. Progressed resistance, range, and repetitions as indicated. All exercises performed with emphasis on kegel and breathing in order improve coordination, awareness and to minimize symptoms. Date:  21 Date:   Date:     Activity/Exercise Parameters Parameters Parameters   Patient Education Discussed HEP and POC     HEP Reviewed breaking up all therapies HEP into different days                                      Pt gives verbal consent to internal  assessment/treatment no chaperon present.      [facility] Portal     Treatment/Session Summary:  Pt reports good understanding of plan of care, as well as prescribed home exercise program.  All questions were answered to pt's satisfaction. Pt was invited to call with any further questions or concerns. · Response to Treatment:  see evaluation. · Communication/Consultation:  None today  · Equipment provided today:  None today  · Recommendations/Intent for next treatment session: Next visit will focus on biofeedback with PF and TA, manual therapy, coordination of PFM, review there ex.   Total Treatment Billable Duration:  10 minutes  PT Patient Time In/Time Out  Time In: 1400  [facility]    Future Appointments   Date Time Provider Everton Fishman   5/6/2021 11:00 AM Stella Medrano, CURTIS [facility]   5/13/2021 10:00 AM Stella Medrano, CURTIS [facility]   5/19/2021  1:00 PM Stella Medrano DPT [facility]   5/24/2021  1:00 PM Stella Medrano, CURTIS [facility]   6/3/2021  3:00 PM Stella Medrano, CURTIS [facility]   6/9/2021  3:00 PM Stella Medrano, CURTIS [facility]

## 2021-05-05 NOTE — PROGRESS NOTES
Irene Butts  : 1990  Primary: [address] at [address]  [phone]   [phone]        OUTPATIENT PHYSICAL THERAPY: Daily Treatment Note 2021  ICD-10: Treatment Diagnosis: R27.8 Lack of coordination (muscle incoordination), R35.0 Frequency of micturition, N39.46 Mixed incontinence (Urge and stress incontinence), R39.15 Urgency of Urination Excludes1: urge incontinence (N39.41,N39.46)     Precautions/Allergies:   Patient has no allergy information on record. TREATMENT PLAN:  Effective Dates: 2021 TO 2021 (90 days). Frequency/Duration: 1 time a week for 90 Day(s) MEDICAL/REFERRING DIAGNOSIS:  Mixed incontinence [N39.46]   DATE OF ONSET:   REFERRING PHYSICIAN: Gerry Urena  MD Orders: evaluate and treat  Return MD Appointment: -     Pre-treatment Symptoms/Complaints:  Patient reports she has incorporated more yoga into her routine. She has noticed how tense pelvic floor is and how weak she is. Pain: Initial:   0 Post Session:  0/10   Medications Last Reviewed:  2021   Updated Objective Findings:    Ms. Samaria Monroy is a 31 yo female referred to pelvic floor physical therapy (PFPT) by Gerry Urnea,* 2/2 mixed incontinence. Pt reports that symptoms began . Patient was seen in physical therapy in the summer/fall of . She did back PT after pelvic floor. She has two bulging discs L4-5 with mild arthritis. She still has back pain but has her exercises to do. She found that she is extremely really weak. Also, muscles are tense and tight. She still had the incontinence issues while doing back PT. As the back pain is more prominent her incontinence is worse. She still has trouble making it to the bathroom as well as issues with sneezing. Still has sudden urges to urinate.  She is trying to do relaxation at home but having a hard time coordinating it with strengthening. She just started incorporating yoga into her routine.       Urinary: Frequency 12-14 x/day, 0 x/night. Positive for stress urinary incontinence, urge urinary incontinence, urinary urgency, urinary frequency and incomplete emptying. Negative for urinary hesitancy/intermittency, dysuria, hematuria, nocturia and nocturnal enuresis. Pt does not use pads for urinary protection; 0 pads per day (PPD). Fluid intake:  oz water/day; bladder irritants include: coffee in am, sometimes tea in afternoon. Pt does not limit fluid intake due to bladder control. Bowel: Frequency once a day. Positive for none. Negative for pain with bowel movement, pushing/straining with bowel movement, fecal incontinence, constipation and incomplete emptying. Pt does not use pads for bowel protection; 0 pads per day (PPD). Use of stool softeners or laxatives? NO  Sexual: Pt is sexually active with male partners. negative for dyspareunia. Contraception/birth control: yes. Pelvic Organ Prolapse/Pelvic Pain: Location: none. OB History: Number of pregnancies: 0 Number of vaginal deliveries: 0 Number of c-sections: 0. External Observations:  · Voluntary contraction: []? absent     [x]? present  · Involuntary contraction: []? absent     [x]? present  · Involuntary relaxation: []? absent     [x]? present  · Perineal descent at rest: []? absent     [x]? present  · Perineal descent with bearing: []? absent     [x]? present     Pelvic Floor Muscle (PFM) Assessment:  · Vaginal vault size: []? decreased     []? increased     [x]? WNL  · Muscle volume: []? decreased     [x]? WNL     []? Defect  · PFM tension: []? decreased     [x]? increased     []? WNL     Contraction ability:  Voluntary contraction: []? absent     [x]? weak     []? moderate      []? strong  Voluntary relaxation: []? absent     [x]? partial     []? complete   MMT: 2/5   Quality of contractions: unable to isolate PFM and TA  Overflow: []? absent     []? min     []? mod     []? severe / Compensatory mm groups include      Tissue laxity test:  Anterior wall: [x]? minimum     []? moderate     []? severe      []? WNL  Posterior wall: [x]? minimum     []? moderate     []? severe      []? WNL   TREATMENT:   THERAPEUTIC EXERCISE: ( minutes):  Exercises per grid below to improve mobility, strength, and coordination. Required minimal visual, verbal, and manual cues to promote proper body alignment, promote proper body posture, and promote proper body mechanics. Progressed resistance, range, and repetitions as indicated. All exercises performed with emphasis on kegel and breathing in order improve coordination, awareness and to minimize symptoms. Date:  4-27-21 Date:   Date:     Activity/Exercise Parameters Parameters Parameters   Patient Education Discussed HEP and POC     HEP Reviewed breaking up all therapies HEP into different days                                      Pt gives verbal consent to internal  assessment/treatment no chaperon present. NEURO REEDUCATION: (39) to improve control and coordination of pelvic floor     Date:  5-6-21 Date:   Date:     Activity/Exercise Parameters Parameters Parameters   Biofeedback With sEMG was utilized for coordination of PFM. Supine, Sitting, Standing. PF and TA isolation, contraction, drops                                                 [facility] Portal     Treatment/Session Summary:  Pt reports good understanding of plan of care, as well as prescribed home exercise program.  All questions were answered to pt's satisfaction. Pt was invited to call with any further questions or concerns. · Response to Treatment:  Patient has difficulty isolation PFM and TA. Handouts given for 10 minute stretch routine and 360 breathing.    · Communication/Consultation:  None today  · Equipment provided today:  None today  · Recommendations/Intent for next treatment session: Next visit will focus on  manual therapy, coordination of PFM, review there ex.   Total Treatment Billable Duration:  45 minutes neuro  PT Patient Time In/Time Out  Time In: 1115  Time Out: 210 Trace Rizo DPT    Future Appointments   Date Time Provider Everton Fishman   5/13/2021 10:00 AM Darnell Mckenzie DPT [facility]   5/19/2021  1:00 PM Darnell Mckenzie DPT [facility]   5/24/2021  1:00 PM Darnell Mckenzie DPT [facility]   6/3/2021  3:00 PM Darnell Mckenzie DPT [facility]   6/9/2021  3:00 PM Darnell Mckenzie DPT [facility]

## 2021-05-06 ENCOUNTER — HOSPITAL ENCOUNTER (OUTPATIENT)
Dept: PHYSICAL THERAPY | Age: 31
Discharge: HOME | End: 2021-05-06
Payer: COMMERCIAL

## 2021-05-06 PROCEDURE — 97112 NEUROMUSCULAR REEDUCATION: CPT

## 2021-05-12 NOTE — PROGRESS NOTES
Irene Butts  : 1990  Primary: [address] at [address]  [phone]   [phone]        OUTPATIENT PHYSICAL THERAPY: Daily Treatment Note 2021  ICD-10: Treatment Diagnosis: R27.8 Lack of coordination (muscle incoordination), R35.0 Frequency of micturition, N39.46 Mixed incontinence (Urge and stress incontinence), R39.15 Urgency of Urination Excludes1: urge incontinence (N39.41,N39.46)     Precautions/Allergies:   Patient has no allergy information on record. TREATMENT PLAN:  Effective Dates: 2021 TO 2021 (90 days). Frequency/Duration: 1 time a week for 90 Day(s) MEDICAL/REFERRING DIAGNOSIS:  Mixed incontinence [N39.46]   DATE OF ONSET:   REFERRING PHYSICIAN: Gerry Urena  MD Orders: evaluate and treat  Return MD Appointment: -     Pre-treatment Symptoms/Complaints:  Patient reports she is sore because she has done a lot of yoga. She has noticed that when there is core stability she is drawing in pelvic floor. No change in incontinence and urgency. She noticed that a little leakage will happen even when its not a strong urge. She thinks her leakage is worse during the time of the month. Pain: Initial:   0 Post Session:  0/10   Medications Last Reviewed:  2021   Updated Objective Findings:    Ms. Samaria Monroy is a 33 yo female referred to pelvic floor physical therapy (PFPT) by Gerry Urena,* 2/2 mixed incontinence. Pt reports that symptoms began . Patient was seen in physical therapy in the summer/fall of . She did back PT after pelvic floor. She has two bulging discs L4-5 with mild arthritis. She still has back pain but has her exercises to do. She found that she is extremely really weak. Also, muscles are tense and tight. She still had the incontinence issues while doing back PT.  As the back pain is more prominent her incontinence is worse. She still has trouble making it to the bathroom as well as issues with sneezing. Still has sudden urges to urinate. She is trying to do relaxation at home but having a hard time coordinating it with strengthening. She just started incorporating yoga into her routine.       Urinary: Frequency 12-14 x/day, 0 x/night. Positive for stress urinary incontinence, urge urinary incontinence, urinary urgency, urinary frequency and incomplete emptying. Negative for urinary hesitancy/intermittency, dysuria, hematuria, nocturia and nocturnal enuresis. Pt does not use pads for urinary protection; 0 pads per day (PPD). Fluid intake:  oz water/day; bladder irritants include: coffee in am, sometimes tea in afternoon. Pt does not limit fluid intake due to bladder control. Bowel: Frequency once a day. Positive for none. Negative for pain with bowel movement, pushing/straining with bowel movement, fecal incontinence, constipation and incomplete emptying. Pt does not use pads for bowel protection; 0 pads per day (PPD). Use of stool softeners or laxatives? NO  Sexual: Pt is sexually active with male partners. negative for dyspareunia. Contraception/birth control: yes. Pelvic Organ Prolapse/Pelvic Pain: Location: none. OB History: Number of pregnancies: 0 Number of vaginal deliveries: 0 Number of c-sections: 0. External Observations:  · Voluntary contraction: []? absent     [x]? present  · Involuntary contraction: []? absent     [x]? present  · Involuntary relaxation: []? absent     [x]? present  · Perineal descent at rest: []? absent     [x]? present  · Perineal descent with bearing: []? absent     [x]? present     Pelvic Floor Muscle (PFM) Assessment:  · Vaginal vault size: []? decreased     []? increased     [x]? WNL  · Muscle volume: []? decreased     [x]? WNL     []? Defect  · PFM tension: []? decreased     [x]? increased     []? WNL     Contraction ability:  Voluntary contraction: []? absent     [x]? weak     []? moderate      []? strong  Voluntary relaxation: []? absent     [x]? partial     []? complete   MMT: 2/5   Quality of contractions: unable to isolate PFM and TA  Overflow: []? absent     []? min     []? mod     []? severe / Compensatory mm groups include      Tissue laxity test:  Anterior wall: [x]? minimum     []? moderate     []? severe      []? WNL  Posterior wall: [x]? minimum     []? moderate     []? severe      []? WNL   TREATMENT:   THERAPEUTIC EXERCISE: (15 minutes):  Exercises per grid below to improve mobility, strength, and coordination. Required minimal visual, verbal, and manual cues to promote proper body alignment, promote proper body posture, and promote proper body mechanics. Progressed resistance, range, and repetitions as indicated. All exercises performed with emphasis on kegel and breathing in order improve coordination, awareness and to minimize symptoms. Date:  4-27-21 Date:  5-13-21 Date:     Activity/Exercise Parameters Parameters Parameters   Patient Education Discussed HEP and POC     HEP Reviewed breaking up all therapies HEP into different days     3rd position bridge  x20 green band      90/90 heel taps    x20 B    Quadruped alt arm/leg  x20 B      Bench squat  10# x 20               Pt gives verbal consent to internal  assessment/treatment no chaperon present. NEURO REEDUCATION: () to improve control and coordination of pelvic floor     Date:  5-6-21 Date:   Date:     Activity/Exercise Parameters Parameters Parameters   Biofeedback With sEMG was utilized for coordination of PFM. Supine, Sitting, Standing. PF and TA isolation, contraction, drops                                             MANUAL THERAPY: (45 minutes) to improve soft tissue tone    Date Type Location Comments   5/13/2021 Internal assessment/treatment Via vaginal canal SP, CTM.  C/R                                         (Used abbreviations: MET - muscle energy technique; SCS- Strain counter strain; CTM-Connective tissue mobilizations; CR- Contract/relax; SP- Sustained pressure, TrP-Trigger point release, IASTM- Instrument assisted soft tissue mobilizations, TDN-Trigger point dry needling)        [facility] Portal     Treatment/Session Summary:  Pt reports good understanding of plan of care, as well as prescribed home exercise program.  All questions were answered to pt's satisfaction. Pt was invited to call with any further questions or concerns. · Response to Treatment:  Patient with tightness to deeper pelvic floor today. Better contraction but still weaker. · Communication/Consultation:  None today  · Equipment provided today:  None today  · Recommendations/Intent for next treatment session: Next visit will focus on  manual therapy, coordination of PFM, review there ex.   Total Treatment Billable Duration:  45 minutes manual, 10 minutes there ex  PT Patient Time In/Time Out  Time In: 1000  Time Out: [address] [place], [place]    Future Appointments   Date Time Provider Everton Fishman   5/19/2021  1:00 PM CURTIS Matias [facility]   5/24/2021  1:00 PM CURTIS Matias [facility]   6/3/2021  3:00 PM CURTIS Matias [facility]   6/9/2021  3:00 PM Ksenia Booth DPT [facility]

## 2021-05-13 ENCOUNTER — HOSPITAL ENCOUNTER (OUTPATIENT)
Dept: PHYSICAL THERAPY | Age: 31
Discharge: HOME | End: 2021-05-13
Payer: COMMERCIAL

## 2021-05-13 PROCEDURE — 97140 MANUAL THERAPY 1/> REGIONS: CPT

## 2021-05-13 PROCEDURE — 97110 THERAPEUTIC EXERCISES: CPT

## 2021-05-19 ENCOUNTER — HOSPITAL ENCOUNTER (OUTPATIENT)
Dept: PHYSICAL THERAPY | Age: 31
Discharge: HOME | End: 2021-05-19
Payer: COMMERCIAL

## 2021-05-19 PROCEDURE — 97110 THERAPEUTIC EXERCISES: CPT

## 2021-05-19 NOTE — PROGRESS NOTES
Gauri Castaneda  : 1990  Primary: [address] at [address]  [phone]   [phone]        OUTPATIENT PHYSICAL THERAPY: Daily Treatment Note 2021  ICD-10: Treatment Diagnosis: R27.8 Lack of coordination (muscle incoordination), R35.0 Frequency of micturition, N39.46 Mixed incontinence (Urge and stress incontinence), R39.15 Urgency of Urination Excludes1: urge incontinence (N39.41,N39.46)     Precautions/Allergies:   Patient has no allergy information on record. TREATMENT PLAN:  Effective Dates: 2021 TO 2021 (90 days). Frequency/Duration: 1 time a week for 90 Day(s) MEDICAL/REFERRING DIAGNOSIS:  Mixed incontinence [N39.46]   DATE OF ONSET:   REFERRING PHYSICIAN: Kenton Mcmanus MD Orders: evaluate and treat  Return MD Appointment: -     Pre-treatment Symptoms/Complaints:  Patient reports pelvic floor is status quo. She is noticing a difference in period week versus not period week. Period week is much worse with urgency and leakage. Yesterday she noticed she didn't leak when she really had to go to the bathroom. Pain: Initial:   0 Post Session:  0/10   Medications Last Reviewed:  2021   Updated Objective Findings:    Ms. Oren Gonsales is a 33 yo female referred to pelvic floor physical therapy (PFPT) by Kenton Mcmanus,* 2/2 mixed incontinence. Pt reports that symptoms began . Patient was seen in physical therapy in the summer/fall of . She did back PT after pelvic floor. She has two bulging discs L4-5 with mild arthritis. She still has back pain but has her exercises to do. She found that she is extremely really weak. Also, muscles are tense and tight. She still had the incontinence issues while doing back PT. As the back pain is more prominent her incontinence is worse.  She still has trouble making it to the bathroom as well as issues with sneezing. Still has sudden urges to urinate. She is trying to do relaxation at home but having a hard time coordinating it with strengthening. She just started incorporating yoga into her routine.       Urinary: Frequency 12-14 x/day, 0 x/night. Positive for stress urinary incontinence, urge urinary incontinence, urinary urgency, urinary frequency and incomplete emptying. Negative for urinary hesitancy/intermittency, dysuria, hematuria, nocturia and nocturnal enuresis. Pt does not use pads for urinary protection; 0 pads per day (PPD). Fluid intake:  oz water/day; bladder irritants include: coffee in am, sometimes tea in afternoon. Pt does not limit fluid intake due to bladder control. Bowel: Frequency once a day. Positive for none. Negative for pain with bowel movement, pushing/straining with bowel movement, fecal incontinence, constipation and incomplete emptying. Pt does not use pads for bowel protection; 0 pads per day (PPD). Use of stool softeners or laxatives? NO  Sexual: Pt is sexually active with male partners. negative for dyspareunia. Contraception/birth control: yes. Pelvic Organ Prolapse/Pelvic Pain: Location: none. OB History: Number of pregnancies: 0 Number of vaginal deliveries: 0 Number of c-sections: 0. External Observations:  · Voluntary contraction: []? absent     [x]? present  · Involuntary contraction: []? absent     [x]? present  · Involuntary relaxation: []? absent     [x]? present  · Perineal descent at rest: []? absent     [x]? present  · Perineal descent with bearing: []? absent     [x]? present     Pelvic Floor Muscle (PFM) Assessment:  · Vaginal vault size: []? decreased     []? increased     [x]? WNL  · Muscle volume: []? decreased     [x]? WNL     []? Defect  · PFM tension: []? decreased     [x]? increased     []? WNL     Contraction ability:  Voluntary contraction: []? absent     [x]? weak     []? moderate      []? strong  Voluntary relaxation: []? absent     [x]? partial     []? complete   MMT: 2/5   Quality of contractions: unable to isolate PFM and TA  Overflow: []? absent     []? min     []? mod     []? severe / Compensatory mm groups include      Tissue laxity test:  Anterior wall: [x]? minimum     []? moderate     []? severe      []? WNL  Posterior wall: [x]? minimum     []? moderate     []? severe      []? WNL   TREATMENT:   THERAPEUTIC EXERCISE: (55 minutes):  Exercises per grid below to improve mobility, strength, and coordination. Required minimal visual, verbal, and manual cues to promote proper body alignment, promote proper body posture, and promote proper body mechanics. Progressed resistance, range, and repetitions as indicated. All exercises performed with emphasis on kegel and breathing in order improve coordination, awareness and to minimize symptoms. Date:  4-27-21 Date:  5-13-21 Date:  5-19-21   Activity/Exercise Parameters Parameters Parameters   Patient Education Discussed HEP and POC  Discussed dilators and given handouts   HEP Reviewed breaking up all therapies HEP into different days     3rd position bridge  x20 green band      90/90 heel taps    x20 B x20 B   Quadruped alt arm/leg  x20 B      Bench squat  10# x 20      Iso Abdominal   Unilateral holds 5 seconds to fatigue x 2 rounds    Unilateral holds with heel tap in 90/90 to fatigue x 2 rounds   Pelvic tilt   x10     Side plank   10 sec x 10 B     Chops   Blue band all directions                  Pt gives verbal consent to internal  assessment/treatment no chaperon present. NEURO REEDUCATION: () to improve control and coordination of pelvic floor     Date:  5-6-21 Date:   Date:     Activity/Exercise Parameters Parameters Parameters   Biofeedback With sEMG was utilized for coordination of PFM. Supine, Sitting, Standing.  PF and TA isolation, contraction, drops                                             MANUAL THERAPY: (0 minutes) to improve soft tissue tone    Date Type Location Comments 5/19/2021 Internal assessment/treatment Via vaginal canal SP, CTM. C/R                                         (Used abbreviations: MET - muscle energy technique; SCS- Strain counter strain; CTM-Connective tissue mobilizations; CR- Contract/relax; SP- Sustained pressure, TrP-Trigger point release, IASTM- Instrument assisted soft tissue mobilizations, TDN-Trigger point dry needling)        [facility] Portal     Treatment/Session Summary:  Pt reports good understanding of plan of care, as well as prescribed home exercise program.  All questions were answered to pt's satisfaction. Pt was invited to call with any further questions or concerns. · Response to Treatment:  Patient fatigues with core exercises today. Able to better isolate pelvic floor and core. · Communication/Consultation:  None today  · Equipment provided today:  None today  · Recommendations/Intent for next treatment session: Next visit will focus on  manual therapy, coordination of PFM, review there ex.   Total Treatment Billable Duration:  55 minutes there ex  PT Patient Time In/Time Out  Time In: 1300  Time Out: [address], DPT    Future Appointments   Date Time Provider Everton Fishman   5/24/2021  7:00 PM Linda Rosa DPT [facility]   6/3/2021  3:00 PM Linda Rosa DPT [facility]   6/9/2021  3:00 PM Linda Rosa DPT [facility]   6/17/2021  3:00 PM Linda Rosa DPT [facility]

## 2021-05-24 ENCOUNTER — HOSPITAL ENCOUNTER (OUTPATIENT)
Dept: PHYSICAL THERAPY | Age: 31
Discharge: HOME | End: 2021-05-24
Payer: COMMERCIAL

## 2021-05-24 NOTE — PROGRESS NOTES
Wyona Pallas  : 1990  Primary: [address] at [address]  [phone]   [phone]                  DATE: 2021    Patient cancelled appointment  today due to work conflict. Will plan to follow up on next scheduled visit.     Zaynab Haynes DPT

## 2021-06-03 ENCOUNTER — HOSPITAL ENCOUNTER (OUTPATIENT)
Dept: PHYSICAL THERAPY | Age: 31
Discharge: HOME | End: 2021-06-03
Payer: COMMERCIAL

## 2021-06-03 PROCEDURE — 97140 MANUAL THERAPY 1/> REGIONS: CPT

## 2021-06-03 PROCEDURE — 97110 THERAPEUTIC EXERCISES: CPT

## 2021-06-03 NOTE — PROGRESS NOTES
Jazlyn Marina  : 1990  Primary: [address] at [address]  [phone]   [phone]        OUTPATIENT PHYSICAL THERAPY: Daily Treatment Note 6/3/2021  ICD-10: Treatment Diagnosis: R27.8 Lack of coordination (muscle incoordination), R35.0 Frequency of micturition, N39.46 Mixed incontinence (Urge and stress incontinence), R39.15 Urgency of Urination Excludes1: urge incontinence (N39.41,N39.46)     Precautions/Allergies:   Patient has no allergy information on record. TREATMENT PLAN:  Effective Dates: 2021 TO 2021 (90 days). Frequency/Duration: 1 time a week for 90 Day(s) MEDICAL/REFERRING DIAGNOSIS:  Mixed incontinence [N39.46]   DATE OF ONSET:   REFERRING PHYSICIAN: Chinyere Fuchs MD Orders: evaluate and treat  Return MD Appointment: -     Pre-treatment Symptoms/Complaints:  Patient reports she thinks her pelvic floor is better. When she travelled to the wedding she had no urgency and only a little bit of leakage. She was able to drink alcohol. Since she has been home urgency increased. Pain: Initial:   0 Post Session:  0/10   Medications Last Reviewed:  6/3/2021   Updated Objective Findings:    Ms. Gume Wilkerson is a 31 yo female referred to pelvic floor physical therapy (PFPT) by Chinyere Fuchs,* 2/2 mixed incontinence. Pt reports that symptoms began . Patient was seen in physical therapy in the summer/fall of . She did back PT after pelvic floor. She has two bulging discs L4-5 with mild arthritis. She still has back pain but has her exercises to do. She found that she is extremely really weak. Also, muscles are tense and tight. She still had the incontinence issues while doing back PT. As the back pain is more prominent her incontinence is worse. She still has trouble making it to the bathroom as well as issues with sneezing.  Still has sudden urges to urinate. She is trying to do relaxation at home but having a hard time coordinating it with strengthening. She just started incorporating yoga into her routine.       Urinary: Frequency 12-14 x/day, 0 x/night. Positive for stress urinary incontinence, urge urinary incontinence, urinary urgency, urinary frequency and incomplete emptying. Negative for urinary hesitancy/intermittency, dysuria, hematuria, nocturia and nocturnal enuresis. Pt does not use pads for urinary protection; 0 pads per day (PPD). Fluid intake:  oz water/day; bladder irritants include: coffee in am, sometimes tea in afternoon. Pt does not limit fluid intake due to bladder control. Bowel: Frequency once a day. Positive for none. Negative for pain with bowel movement, pushing/straining with bowel movement, fecal incontinence, constipation and incomplete emptying. Pt does not use pads for bowel protection; 0 pads per day (PPD). Use of stool softeners or laxatives? NO  Sexual: Pt is sexually active with male partners. negative for dyspareunia. Contraception/birth control: yes. Pelvic Organ Prolapse/Pelvic Pain: Location: none. OB History: Number of pregnancies: 0 Number of vaginal deliveries: 0 Number of c-sections: 0. External Observations:  · Voluntary contraction: []? absent     [x]? present  · Involuntary contraction: []? absent     [x]? present  · Involuntary relaxation: []? absent     [x]? present  · Perineal descent at rest: []? absent     [x]? present  · Perineal descent with bearing: []? absent     [x]? present     Pelvic Floor Muscle (PFM) Assessment:  · Vaginal vault size: []? decreased     []? increased     [x]? WNL  · Muscle volume: []? decreased     [x]? WNL     []? Defect  · PFM tension: []? decreased     [x]? increased     []? WNL     Contraction ability:  Voluntary contraction: []? absent     [x]? weak     []? moderate      []? strong  Voluntary relaxation: []? absent     [x]? partial     []?  complete   MMT: 2/5 Quality of contractions: unable to isolate PFM and TA  Overflow: []? absent     []? min     []? mod     []? severe / Compensatory mm groups include      Tissue laxity test:  Anterior wall: [x]? minimum     []? moderate     []? severe      []? WNL  Posterior wall: [x]? minimum     []? moderate     []? severe      []? WNL   TREATMENT:   THERAPEUTIC EXERCISE: (25 minutes):  Exercises per grid below to improve mobility, strength, and coordination. Required minimal visual, verbal, and manual cues to promote proper body alignment, promote proper body posture, and promote proper body mechanics. Progressed resistance, range, and repetitions as indicated. All exercises performed with emphasis on kegel and breathing in order improve coordination, awareness and to minimize symptoms. Date:  4-27-21 Date:  5-13-21 Date:  5-19-21 DATE:  6-3-21   Activity/Exercise Parameters Parameters Parameters    Patient Education Discussed HEP and POC  Discussed dilators and given handouts Discussed keeping diary   HEP Reviewed breaking up all therapies HEP into different days      3rd position bridge  x20 green band       90/90 heel taps    x20 B x20 B    Quadruped alt arm/leg  x20 B       Bench squat  10# x 20       Iso Abdominal   Unilateral holds 5 seconds to fatigue x 2 rounds    Unilateral holds with heel tap in 90/90 to fatigue x 2 rounds    Pelvic tilt   x10      Side plank   10 sec x 10 B      Chops   Blue band all directions Green band  2 directions to fatigue   Sidestep T band    Green band both directions to fatigue   Rows    Green band x 20     Extensions    Green band x 20        Pt gives verbal consent to internal  assessment/treatment no chaperon present. NEURO REEDUCATION: () to improve control and coordination of pelvic floor     Date:  5-6-21 Date:   Date:     Activity/Exercise Parameters Parameters Parameters   Biofeedback With sEMG was utilized for coordination of PFM. Supine, Sitting, Standing.  PF and TA isolation, contraction, drops                                             MANUAL THERAPY: (30 minutes) to improve soft tissue tone    Date Type Location Comments   6/3/2021 Internal assessment/treatment Via vaginal canal SP, CTM. C/R                                         (Used abbreviations: MET - muscle energy technique; SCS- Strain counter strain; CTM-Connective tissue mobilizations; CR- Contract/relax; SP- Sustained pressure, TrP-Trigger point release, IASTM- Instrument assisted soft tissue mobilizations, TDN-Trigger point dry needling)        [facility] Portal     Treatment/Session Summary:  Pt reports good understanding of plan of care, as well as prescribed home exercise program.  All questions were answered to pt's satisfaction. Pt was invited to call with any further questions or concerns. · Response to Treatment:  Patient with less tightness to pelvic floor and better contraction. Next time assess on biofeedback. · Communication/Consultation:  None today  · Equipment provided today:  None today  · Recommendations/Intent for next treatment session: Next visit will focus on  manual therapy, coordination of PFM, review there ex.   Total Treatment Billable Duration:  25 minutes there ex, 30 minutes manual  PT Patient Time In/Time Out  Time In: 1500  Time Out: Elina Aceves, DPT    Future Appointments   Date Time Provider Everton Fishman   6/9/2021  3:00 PM Nena May DPT [facility]   6/17/2021  3:00 PM Nena May DPT [facility]

## 2021-06-09 ENCOUNTER — HOSPITAL ENCOUNTER (OUTPATIENT)
Dept: PHYSICAL THERAPY | Age: 31
Discharge: HOME | End: 2021-06-09
Payer: COMMERCIAL

## 2021-06-09 PROCEDURE — 97112 NEUROMUSCULAR REEDUCATION: CPT

## 2021-06-09 NOTE — PROGRESS NOTES
Freida Husbands  : 1990  Primary: [address] at [address]  [phone]   [phone]        OUTPATIENT PHYSICAL THERAPY: Daily Treatment Note 2021  ICD-10: Treatment Diagnosis: R27.8 Lack of coordination (muscle incoordination), R35.0 Frequency of micturition, N39.46 Mixed incontinence (Urge and stress incontinence), R39.15 Urgency of Urination Excludes1: urge incontinence (N39.41,N39.46)     Precautions/Allergies:   Patient has no allergy information on record. TREATMENT PLAN:  Effective Dates: 2021 TO 2021 (90 days). Frequency/Duration: 1 time a week for 90 Day(s) MEDICAL/REFERRING DIAGNOSIS:  Mixed incontinence [N39.46]   DATE OF ONSET:   REFERRING PHYSICIAN: Nisha Handy MD Orders: evaluate and treat  Return MD Appointment: -     Pre-treatment Symptoms/Complaints:  Patient reports she only had a few situations with leakage on the way to the bathroom. Pain: Initial:   0 Post Session:  0/10   Medications Last Reviewed:  2021   Updated Objective Findings:    Ms. Kathrin Hicks is a 33 yo female referred to pelvic floor physical therapy (PFPT) by Nisha Handy,* 2/2 mixed incontinence. Pt reports that symptoms began . Patient was seen in physical therapy in the summer/fall of . She did back PT after pelvic floor. She has two bulging discs L4-5 with mild arthritis. She still has back pain but has her exercises to do. She found that she is extremely really weak. Also, muscles are tense and tight. She still had the incontinence issues while doing back PT. As the back pain is more prominent her incontinence is worse. She still has trouble making it to the bathroom as well as issues with sneezing. Still has sudden urges to urinate. She is trying to do relaxation at home but having a hard time coordinating it with strengthening.  She just started incorporating yoga into her routine.       Urinary: Frequency 12-14 x/day, 0 x/night. Positive for stress urinary incontinence, urge urinary incontinence, urinary urgency, urinary frequency and incomplete emptying. Negative for urinary hesitancy/intermittency, dysuria, hematuria, nocturia and nocturnal enuresis. Pt does not use pads for urinary protection; 0 pads per day (PPD). Fluid intake:  oz water/day; bladder irritants include: coffee in am, sometimes tea in afternoon. Pt does not limit fluid intake due to bladder control. Bowel: Frequency once a day. Positive for none. Negative for pain with bowel movement, pushing/straining with bowel movement, fecal incontinence, constipation and incomplete emptying. Pt does not use pads for bowel protection; 0 pads per day (PPD). Use of stool softeners or laxatives? NO  Sexual: Pt is sexually active with male partners. negative for dyspareunia. Contraception/birth control: yes. Pelvic Organ Prolapse/Pelvic Pain: Location: none. OB History: Number of pregnancies: 0 Number of vaginal deliveries: 0 Number of c-sections: 0. External Observations:  · Voluntary contraction: []? absent     [x]? present  · Involuntary contraction: []? absent     [x]? present  · Involuntary relaxation: []? absent     [x]? present  · Perineal descent at rest: []? absent     [x]? present  · Perineal descent with bearing: []? absent     [x]? present     Pelvic Floor Muscle (PFM) Assessment:  · Vaginal vault size: []? decreased     []? increased     [x]? WNL  · Muscle volume: []? decreased     [x]? WNL     []? Defect  · PFM tension: []? decreased     [x]? increased     []? WNL     Contraction ability:  Voluntary contraction: []? absent     [x]? weak     []? moderate      []? strong  Voluntary relaxation: []? absent     [x]? partial     []? complete   MMT: 2/5   Quality of contractions: unable to isolate PFM and TA  Overflow: []?  absent     []? min     []? mod     []? severe / Compensatory mm groups include      Tissue laxity test:  Anterior wall: [x]? minimum     []? moderate     []? severe      []? WNL  Posterior wall: [x]? minimum     []? moderate     []? severe      []? WNL   TREATMENT:   THERAPEUTIC EXERCISE: (0 minutes):  Exercises per grid below to improve mobility, strength, and coordination. Required minimal visual, verbal, and manual cues to promote proper body alignment, promote proper body posture, and promote proper body mechanics. Progressed resistance, range, and repetitions as indicated. All exercises performed with emphasis on kegel and breathing in order improve coordination, awareness and to minimize symptoms. Date:  4-27-21 Date:  5-13-21 Date:  5-19-21 DATE:  6-3-21   Activity/Exercise Parameters Parameters Parameters    Patient Education Discussed HEP and POC  Discussed dilators and given handouts Discussed keeping diary   HEP Reviewed breaking up all therapies HEP into different days      3rd position bridge  x20 green band       90/90 heel taps    x20 B x20 B    Quadruped alt arm/leg  x20 B       Bench squat  10# x 20       Iso Abdominal   Unilateral holds 5 seconds to fatigue x 2 rounds    Unilateral holds with heel tap in 90/90 to fatigue x 2 rounds    Pelvic tilt   x10      Side plank   10 sec x 10 B      Chops   Blue band all directions Green band  2 directions to fatigue   Sidestep T band    Green band both directions to fatigue   Rows    Green band x 20     Extensions    Green band x 20        Pt gives verbal consent to internal  assessment/treatment no chaperon present. NEURO REEDUCATION: (54) to improve control and coordination of pelvic floor     Date:  5-6-21 Date:  6-9-21 Date:     Activity/Exercise Parameters Parameters Parameters   Biofeedback With sEMG was utilized for coordination of PFM. Supine, Sitting, Standing. PF and TA isolation, contraction, drops With sEMG was utilized for coordination of PFM. Supine, Sitting, Standing.  PF and TA isolation with 2 leads                                            MANUAL THERAPY: (0 minutes) to improve soft tissue tone    Date Type Location Comments   6/9/2021 Internal assessment/treatment Via vaginal canal SP, CTM. C/R                                         (Used abbreviations: MET - muscle energy technique; SCS- Strain counter strain; CTM-Connective tissue mobilizations; CR- Contract/relax; SP- Sustained pressure, TrP-Trigger point release, IASTM- Instrument assisted soft tissue mobilizations, TDN-Trigger point dry needling)        [facility] Portal     Treatment/Session Summary:  Pt reports good understanding of plan of care, as well as prescribed home exercise program.  All questions were answered to pt's satisfaction. Pt was invited to call with any further questions or concerns. · Response to Treatment:  Patient demonstrates ability to keep pelvic floor relaxed in all positions. After putting on abdominal lead she has difficulty disassociating core and pelvic floor. · Communication/Consultation:  None today  · Equipment provided today:  None today  · Recommendations/Intent for next treatment session: Next visit will focus on  manual therapy, coordination of PFM, review there ex.   Total Treatment Billable Duration:  55 minutes neuro  PT Patient Time In/Time Out  Time In: 1500  Time Out: Elina Aceves, DPT    Future Appointments   Date Time Provider Everton Fishman   6/17/2021  3:00 PM Shwetha Fofana DPT [facility]   6/24/2021 11:00 AM Shwetha Fofana DPT [facility]   6/30/2021  1:00 PM Shwetha Fofana DPT [facility]

## 2021-06-10 ENCOUNTER — APPOINTMENT (OUTPATIENT)
Dept: PHYSICAL THERAPY | Age: 31
End: 2021-06-10
Payer: COMMERCIAL

## 2021-06-17 ENCOUNTER — HOSPITAL ENCOUNTER (OUTPATIENT)
Dept: PHYSICAL THERAPY | Age: 31
Discharge: HOME | End: 2021-06-17
Payer: COMMERCIAL

## 2021-06-17 PROCEDURE — 97140 MANUAL THERAPY 1/> REGIONS: CPT

## 2021-06-17 NOTE — PROGRESS NOTES
Kristy Sender  : 1990  Primary: [address] at [address]  [phone]   [phone]        OUTPATIENT PHYSICAL THERAPY: Daily Treatment Note 2021  ICD-10: Treatment Diagnosis: R27.8 Lack of coordination (muscle incoordination), R35.0 Frequency of micturition, N39.46 Mixed incontinence (Urge and stress incontinence), R39.15 Urgency of Urination Excludes1: urge incontinence (N39.41,N39.46)     Precautions/Allergies:   Patient has no allergy information on record. TREATMENT PLAN:  Effective Dates: 2021 TO 2021 (90 days). Frequency/Duration: 1 time a week for 90 Day(s) MEDICAL/REFERRING DIAGNOSIS:  Mixed incontinence [N39.46]   DATE OF ONSET:   REFERRING PHYSICIAN: Rosalva Sheehan MD Orders: evaluate and treat  Return MD Appointment: -     Pre-treatment Symptoms/Complaints:  Patient reports she thinks the period week is a  for pelvic floor but not the cause. She noticed when she relaxes abdominals and hold pelvic floor leakage is more controllable. Pain: Initial:   0 Post Session:  0/10   Medications Last Reviewed:  2021   Updated Objective Findings:    Ms. Sola Ng is a 31 yo female referred to pelvic floor physical therapy (PFPT) by Rosalva Sheehan,* 2/2 mixed incontinence. Pt reports that symptoms began . Patient was seen in physical therapy in the summer/fall of . She did back PT after pelvic floor. She has two bulging discs L4-5 with mild arthritis. She still has back pain but has her exercises to do. She found that she is extremely really weak. Also, muscles are tense and tight. She still had the incontinence issues while doing back PT. As the back pain is more prominent her incontinence is worse. She still has trouble making it to the bathroom as well as issues with sneezing. Still has sudden urges to urinate.  She is trying to do relaxation at home but having a hard time coordinating it with strengthening. She just started incorporating yoga into her routine.       Urinary: Frequency 12-14 x/day, 0 x/night. Positive for stress urinary incontinence, urge urinary incontinence, urinary urgency, urinary frequency and incomplete emptying. Negative for urinary hesitancy/intermittency, dysuria, hematuria, nocturia and nocturnal enuresis. Pt does not use pads for urinary protection; 0 pads per day (PPD). Fluid intake:  oz water/day; bladder irritants include: coffee in am, sometimes tea in afternoon. Pt does not limit fluid intake due to bladder control. Bowel: Frequency once a day. Positive for none. Negative for pain with bowel movement, pushing/straining with bowel movement, fecal incontinence, constipation and incomplete emptying. Pt does not use pads for bowel protection; 0 pads per day (PPD). Use of stool softeners or laxatives? NO  Sexual: Pt is sexually active with male partners. negative for dyspareunia. Contraception/birth control: yes. Pelvic Organ Prolapse/Pelvic Pain: Location: none. OB History: Number of pregnancies: 0 Number of vaginal deliveries: 0 Number of c-sections: 0. External Observations:  · Voluntary contraction: []? absent     [x]? present  · Involuntary contraction: []? absent     [x]? present  · Involuntary relaxation: []? absent     [x]? present  · Perineal descent at rest: []? absent     [x]? present  · Perineal descent with bearing: []? absent     [x]? present     Pelvic Floor Muscle (PFM) Assessment:  · Vaginal vault size: []? decreased     []? increased     [x]? WNL  · Muscle volume: []? decreased     [x]? WNL     []? Defect  · PFM tension: []? decreased     [x]? increased     []? WNL     Contraction ability:  Voluntary contraction: []? absent     [x]? weak     []? moderate      []? strong  Voluntary relaxation: []? absent     [x]? partial     []?  complete   MMT: 2/5   Quality of contractions: unable to isolate PFM and TA  Overflow: []? absent     []? min     []? mod     []? severe / Compensatory mm groups include      Tissue laxity test:  Anterior wall: [x]? minimum     []? moderate     []? severe      []? WNL  Posterior wall: [x]? minimum     []? moderate     []? severe      []? WNL   TREATMENT:   THERAPEUTIC EXERCISE: (0 minutes):  Exercises per grid below to improve mobility, strength, and coordination. Required minimal visual, verbal, and manual cues to promote proper body alignment, promote proper body posture, and promote proper body mechanics. Progressed resistance, range, and repetitions as indicated. All exercises performed with emphasis on kegel and breathing in order improve coordination, awareness and to minimize symptoms. Date:  4-27-21 Date:  5-13-21 Date:  5-19-21 DATE:  6-3-21   Activity/Exercise Parameters Parameters Parameters    Patient Education Discussed HEP and POC  Discussed dilators and given handouts Discussed keeping diary   HEP Reviewed breaking up all therapies HEP into different days      3rd position bridge  x20 green band       90/90 heel taps    x20 B x20 B    Quadruped alt arm/leg  x20 B       Bench squat  10# x 20       Iso Abdominal   Unilateral holds 5 seconds to fatigue x 2 rounds    Unilateral holds with heel tap in 90/90 to fatigue x 2 rounds    Pelvic tilt   x10      Side plank   10 sec x 10 B      Chops   Blue band all directions Green band  2 directions to fatigue   Sidestep T band    Green band both directions to fatigue   Rows    Green band x 20     Extensions    Green band x 20        Pt gives verbal consent to internal  assessment/treatment no chaperon present. NEURO REEDUCATION: (0) to improve control and coordination of pelvic floor     Date:  5-6-21 Date:  6-9-21 Date:     Activity/Exercise Parameters Parameters Parameters   Biofeedback With sEMG was utilized for coordination of PFM. Supine, Sitting, Standing.  PF and TA isolation, contraction, drops With sEMG was utilized for coordination of PFM. Supine, Sitting, Standing. PF and TA isolation with 2 leads                                            MANUAL THERAPY: (45 minutes) to improve soft tissue tone    Date Type Location Comments   6/17/2021 Internal assessment/treatment Via vaginal canal SP, CTM. C/R                                         (Used abbreviations: MET - muscle energy technique; SCS- Strain counter strain; CTM-Connective tissue mobilizations; CR- Contract/relax; SP- Sustained pressure, TrP-Trigger point release, IASTM- Instrument assisted soft tissue mobilizations, TDN-Trigger point dry needling)        [employer] Portal     Treatment/Session Summary:  Pt reports good understanding of plan of care, as well as prescribed home exercise program.  All questions were answered to pt's satisfaction. Pt was invited to call with any further questions or concerns. · Response to Treatment:  Patient with increased tightness to pelvic floor left sided. · Communication/Consultation:  None today  · Equipment provided today:  None today  · Recommendations/Intent for next treatment session: Next visit will focus on  manual therapy, coordination of PFM, review there ex.   Total Treatment Billable Duration:  40 minutes manual  PT Patient Time In/Time Out  Time In: 1500  Time Out: Elina Aceves, ARISTIDEST    Future Appointments   Date Time Provider Everton Fishman   6/24/2021 11:00 AM Kelly Ku DPT [facility]   6/30/2021  1:00 PM Kelly Ku DPT [facility]

## 2021-06-24 ENCOUNTER — HOSPITAL ENCOUNTER (OUTPATIENT)
Dept: PHYSICAL THERAPY | Age: 31
Discharge: HOME | End: 2021-06-24
Payer: COMMERCIAL

## 2021-06-24 NOTE — PROGRESS NOTES
Ramiro Escudero  : 1990  Primary: [address] at [address]  [phone]   [phone]                  DATE: 2021    Patient called to cancel appointment  today due to [address] Ext. Will plan to follow up on next scheduled visit.     ARISTIDES GarciaT

## 2021-06-30 ENCOUNTER — HOSPITAL ENCOUNTER (OUTPATIENT)
Dept: PHYSICAL THERAPY | Age: 31
Discharge: HOME | End: 2021-06-30
Payer: COMMERCIAL

## 2021-06-30 PROCEDURE — 97140 MANUAL THERAPY 1/> REGIONS: CPT

## 2021-06-30 PROCEDURE — 97110 THERAPEUTIC EXERCISES: CPT

## 2021-06-30 NOTE — PROGRESS NOTES
Brit Stinson  : 1990  Primary: [address] at [address]  [phone]   [phone]        OUTPATIENT PHYSICAL THERAPY: Daily Treatment Note 2021  ICD-10: Treatment Diagnosis: R27.8 Lack of coordination (muscle incoordination), R35.0 Frequency of micturition, N39.46 Mixed incontinence (Urge and stress incontinence), R39.15 Urgency of Urination Excludes1: urge incontinence (N39.41,N39.46)     Precautions/Allergies:   Patient has no allergy information on record. TREATMENT PLAN:  Effective Dates: 2021 TO 2021 (90 days). Frequency/Duration: 1 time a week for 90 Day(s) MEDICAL/REFERRING DIAGNOSIS:  Mixed incontinence [N39.46]   DATE OF ONSET:   REFERRING PHYSICIAN: Jefferson Novoa MD Orders: evaluate and treat  Return MD Appointment: -     Pre-treatment Symptoms/Complaints:  Patient reports there is a correlation between stress levels and lack of yoga. She hasn't been able to do yoga the past 5 days and her urgency has been worse. Her back has been acting up too. Pain: Initial:   0 Post Session:  0/10   Medications Last Reviewed:  2021   Updated Objective Findings:    Ms. Geeta Pimentel is a 31 yo female referred to pelvic floor physical therapy (PFPT) by Jefferson Novoa,* 2/2 mixed incontinence. Pt reports that symptoms began . Patient was seen in physical therapy in the summer/fall of . She did back PT after pelvic floor. She has two bulging discs L4-5 with mild arthritis. She still has back pain but has her exercises to do. She found that she is extremely really weak. Also, muscles are tense and tight. She still had the incontinence issues while doing back PT. As the back pain is more prominent her incontinence is worse. She still has trouble making it to the bathroom as well as issues with sneezing. Still has sudden urges to urinate.  She is trying to do relaxation at home but having a hard time coordinating it with strengthening. She just started incorporating yoga into her routine.       Urinary: Frequency 12-14 x/day, 0 x/night. Positive for stress urinary incontinence, urge urinary incontinence, urinary urgency, urinary frequency and incomplete emptying. Negative for urinary hesitancy/intermittency, dysuria, hematuria, nocturia and nocturnal enuresis. Pt does not use pads for urinary protection; 0 pads per day (PPD). Fluid intake:  oz water/day; bladder irritants include: coffee in am, sometimes tea in afternoon. Pt does not limit fluid intake due to bladder control. Bowel: Frequency once a day. Positive for none. Negative for pain with bowel movement, pushing/straining with bowel movement, fecal incontinence, constipation and incomplete emptying. Pt does not use pads for bowel protection; 0 pads per day (PPD). Use of stool softeners or laxatives? NO  Sexual: Pt is sexually active with male partners. negative for dyspareunia. Contraception/birth control: yes. Pelvic Organ Prolapse/Pelvic Pain: Location: none. OB History: Number of pregnancies: 0 Number of vaginal deliveries: 0 Number of c-sections: 0. External Observations:  · Voluntary contraction: []? absent     [x]? present  · Involuntary contraction: []? absent     [x]? present  · Involuntary relaxation: []? absent     [x]? present  · Perineal descent at rest: []? absent     [x]? present  · Perineal descent with bearing: []? absent     [x]? present     Pelvic Floor Muscle (PFM) Assessment:  · Vaginal vault size: []? decreased     []? increased     [x]? WNL  · Muscle volume: []? decreased     [x]? WNL     []? Defect  · PFM tension: []? decreased     [x]? increased     []? WNL     Contraction ability:  Voluntary contraction: []? absent     [x]? weak     []? moderate      []? strong  Voluntary relaxation: []? absent     [x]? partial     []?  complete   MMT: 2/5   Quality of contractions: unable to isolate PFM and TA  Overflow: []? absent     []? min     []? mod     []? severe / Compensatory mm groups include      Tissue laxity test:  Anterior wall: [x]? minimum     []? moderate     []? severe      []? WNL  Posterior wall: [x]? minimum     []? moderate     []? severe      []? WNL   TREATMENT:   THERAPEUTIC EXERCISE: (25 minutes):  Exercises per grid below to improve mobility, strength, and coordination. Required minimal visual, verbal, and manual cues to promote proper body alignment, promote proper body posture, and promote proper body mechanics. Progressed resistance, range, and repetitions as indicated. All exercises performed with emphasis on kegel and breathing in order improve coordination, awareness and to minimize symptoms. Date:  4-27-21 Date:  5-13-21 Date:  5-19-21 DATE:  6-3-21 Date:  6-30-21   Activity/Exercise Parameters Parameters Parameters     Patient Education Discussed HEP and POC  Discussed dilators and given handouts Discussed keeping diary Discussed getting back to yoga   HEP Reviewed breaking up all therapies HEP into different days       3rd position bridge  x20 green band        90/90 heel taps    x20 B x20 B     Quadruped alt arm/leg  x20 B        Bench squat  10# x 20        Iso Abdominal   Unilateral holds 5 seconds to fatigue x 2 rounds    Unilateral holds with heel tap in 90/90 to fatigue x 2 rounds     Pelvic tilt   x10       Side plank   10 sec x 10 B       Chops   Blue band all directions Green band  2 directions to fatigue    Sidestep T band    Green band both directions to fatigue    Rows    Green band x 20      Extensions    Green band x 20      SKTC     1 minute holds     Butterfly     1 minute holds     Figure 4     1 minute holds        Pt gives verbal consent to internal  assessment/treatment no chaperon present.      NEURO REEDUCATION: (0) to improve control and coordination of pelvic floor     Date:  5-6-21 Date:  6-9-21 Date: Activity/Exercise Parameters Parameters Parameters   Biofeedback With sEMG was utilized for coordination of PFM. Supine, Sitting, Standing. PF and TA isolation, contraction, drops With sEMG was utilized for coordination of PFM. Supine, Sitting, Standing. PF and TA isolation with 2 leads                                            MANUAL THERAPY: (30 minutes) to improve soft tissue tone    Date Type Location Comments   6/30/2021 Internal assessment/treatment Via vaginal canal SP, CTM. C/R                                         (Used abbreviations: MET - muscle energy technique; SCS- Strain counter strain; CTM-Connective tissue mobilizations; CR- Contract/relax; SP- Sustained pressure, TrP-Trigger point release, IASTM- Instrument assisted soft tissue mobilizations, TDN-Trigger point dry needling)        [employer] Portal     Treatment/Session Summary:  Pt reports good understanding of plan of care, as well as prescribed home exercise program.  All questions were answered to pt's satisfaction. Pt was invited to call with any further questions or concerns. · Response to Treatment:  Patient had tightness more right sided today. Patient is aware of why her symptoms are worse and what she needs to do to get back on track  · Communication/Consultation:  None today  · Equipment provided today:  None today  · Recommendations/Intent for next treatment session: Next visit will focus on  manual therapy, coordination of PFM, review there ex.   Total Treatment Billable Duration:  30 minutes manual, 25 minutes there ex  PT Patient Time In/Time Out  Time In: 1300  Time Out: [address] [employer] [address], DPT    Future Appointments   Date Time Provider Everton Fishman   7/8/2021  3:00 PM Howie Ok, DPT [facility]   7/15/2021  3:00 PM Howie Ok, DPT [facility]   7/22/2021  3:00 PM Howie Ok, DPT [facility]

## 2021-07-08 ENCOUNTER — HOSPITAL ENCOUNTER (OUTPATIENT)
Dept: PHYSICAL THERAPY | Age: 31
Discharge: HOME | End: 2021-07-08
Payer: COMMERCIAL

## 2021-07-08 NOTE — PROGRESS NOTES
Debbie Owens  : 1990  Primary: [address] at [address]  [phone]   [phone]                  DATE: 2021    Patient called to cancel  today . Will plan to follow up on next scheduled visit.     ARISTIDES AnT

## 2021-07-15 ENCOUNTER — APPOINTMENT (OUTPATIENT)
Dept: PHYSICAL THERAPY | Age: 31
End: 2021-07-15
Payer: COMMERCIAL

## 2021-07-22 ENCOUNTER — APPOINTMENT (OUTPATIENT)
Dept: PHYSICAL THERAPY | Age: 31
End: 2021-07-22
Payer: COMMERCIAL

## 2021-07-22 ENCOUNTER — HOSPITAL ENCOUNTER (OUTPATIENT)
Dept: PHYSICAL THERAPY | Age: 31
Discharge: HOME | End: 2021-07-22
Payer: COMMERCIAL

## 2021-07-22 NOTE — PROGRESS NOTES
Realronni David  : 1990  Primary: [address] at [address]  [phone]   [phone]                  DATE: 2021    Patient did not show for appointment  today due to . Will plan to follow up on next scheduled visit.     ARISTIDES RodriguezT

## 2021-07-29 ENCOUNTER — HOSPITAL ENCOUNTER (OUTPATIENT)
Dept: PHYSICAL THERAPY | Age: 31
Discharge: HOME | End: 2021-07-29
Payer: COMMERCIAL

## 2021-07-29 PROCEDURE — 97110 THERAPEUTIC EXERCISES: CPT

## 2021-07-29 NOTE — THERAPY RECERTIFICATION
Julius Putnam  : 1990  Primary: [address] at [address]  [phone]   [phone]        OUTPATIENT PHYSICAL THERAPY:Recertification    ICD-10: Treatment Diagnosis: R27.8 Lack of coordination (muscle incoordination), R35.0 Frequency of micturition, N39.46 Mixed incontinence (Urge and stress incontinence), R39.15 Urgency of Urination Excludes1: urge incontinence (N39.41,N39.46)     Precautions/Allergies:   Patient has no allergy information on record. TREATMENT PLAN:  Effective Dates: 21 to 10-29-21 (90 days). Frequency/Duration: 1 time a week for 90 Day(s) MEDICAL/REFERRING DIAGNOSIS:  Mixed incontinence [N39.46]   DATE OF ONSET:   REFERRING PHYSICIAN: Kimberly Alexander MD Orders: evaluate and treat  Return MD Appointment: -    REASSESSMENT: Ms. Sheri Bernheim has been seen in skilled PT from 21 to 21. Patient has attended 7 out of 10 scheduled visits, with 2 cancellation(s) and 1 no shows. Treatment has emphasized biofeedback, manual therapy, coordintion of PFM/core. Patient has made improvements in better control of core and pelvic floor, and has been more consistent with stress relief and yoga however continues to demonstrate deficits in overactivity of PFM contributing to urinary leakage and urgency. Pt has progressed with goals as indicated below at this point and will continue to benefit from skilled PT in order to achieve remaining goals and maximize functional outcomes in order to return to [facility]. INITIAL ASSESSMENT:  Ms. Sheri Bernheim presents with pelvic floor muscle over activity,  lack of coordination, timing, awareness, endurance and strength. Patient is unable to isloate pelvic floor muscles from abdominals, contributing to over activity and incontinence.  She also has a history of core and hip weakness from bulging discs in her low back resulting in further over activity of PFM. Patient would benefit from skilled physical therapy to address her deficits and maximize her function. PROBLEM LIST (Impacting functional limitations):  Decreased Flexibility/Joint Mobility  Decreased [place] with Home Exercise Program  Decreaesd coordination  Decreased strength of pelvic floor which limits bladder control   INTERVENTIONS PLANNED:  1. Family Education  2. Home Exercise Program (HEP)  3. Manual Therapy  4. Neuromuscular Re-education/Strengthening  5. Therapeutic Activites  6. Therapeutic Exercise/Strengthening     GOALS: (Goals have been discussed and agreed upon with patient.)  Short-Term Functional Goals: Time Frame: CONTINUE GOALS  1. Pt will demonstrate I with basic PFM HEP to improve awareness, coordination, and timing of PFM. 2. Pt will demonstrate understanding of and ability to teach back appropriate water intake, bladder irritants, toileting frequency, and positioning for improved self-management of symptoms. 3. Pt will demonstrate ability to perform diaphragmatic breathing and quick flick pelvic floor contractions in order to implement urge suppression and reduce episodes of UI.  4. Pt will demonstrate ability to perform a coordinate PFM and TA contraction during exhalation for improve coordination with functional tasks. Discharge Goals: Time Frame:   1. Pt will demonstrate ability to voluntarily contract the pelvic floor muscles prior to a cough or sneeze for decreased leakage during increases in intra-abdominal pressure. 2. Pt will demonstrate independence with an advanced HEP for general conditioning, core stabilization, and mobility to facilitate carry over and independent management of symptoms. Urinary: Frequency 12-14 x/day, 0 x/night. Positive for  urge urinary incontinence, urinary urgency, urinary frequency  Negative for  incomplete emptying. ,stress urinary incontinence, urinary hesitancy/intermittency, dysuria, hematuria, nocturia and nocturnal enuresis. Pt does not use pads for urinary protection; 0 pads per day (PPD). Fluid intake:  oz water/day; bladder irritants include: coffee in am, sometimes tea in afternoon. Pt does not limit fluid intake due to bladder control. Bowel: Frequency once a day. Positive for none. Negative for pain with bowel movement, pushing/straining with bowel movement, fecal incontinence, constipation and incomplete emptying. Pt does not use pads for bowel protection; 0 pads per day (PPD). Use of stool softeners or laxatives? NO  Sexual: Pt is sexually active with male partners. negative for dyspareunia. Contraception/birth control: yes. Pelvic Organ Prolapse/Pelvic Pain: Location: none. OB History: Number of pregnancies: 0 Number of vaginal deliveries: 0 Number of c-sections: 0. External Observations:  · Voluntary contraction: []? absent     [x]? present  · Involuntary contraction: []? absent     [x]? present  · Involuntary relaxation: []? absent     [x]? present  · Perineal descent at rest: []? absent     [x]? present  · Perineal descent with bearing: []? absent     [x]? present     Pelvic Floor Muscle (PFM) Assessment:  · Vaginal vault size: []? decreased     []? increased     [x]? WNL  · Muscle volume: []? decreased     [x]? WNL     []? Defect  · PFM tension: []? decreased     [x]? increased     []? WNL     Contraction ability:  Voluntary contraction: []? absent     [x]? weak     []? moderate      []? strong  Voluntary relaxation: []? absent     [x]? partial     []? complete   MMT: 2/5   Overflow: []? absent     []? min     []? mod     []? severe / Compensatory mm groups include      Tissue laxity test:  Anterior wall: [x]? minimum     []? moderate     []? severe      []? WNL  Posterior wall: [x]? minimum     []? moderate     []? severe      []?  WNL    OUTCOME MEASURE:   Tool Used: Pelvic Floor Impact Questionnaireshort form 7 (PFIQ-7)   Score:  Initial:   · Bladder or Urine: 47/100  · Bowel or Rectum: 0/100  · Vagina or Pelvis: 0/100 Most Recent: X (Date: -- )  · Bladder or Urine: X  · Bowel or Rectum: X  · Vagina or Pelvis: X   Interpretation of Score: Each of the 7 sections is scored on a scale from 0-3; 0 representing \"Not at all\", 3 representing \"Quite a bit\". The mean value is taken from all the answered items, then multiplied by 100 to obtain the scale score, ranging from 0-100. This process is repeated for each column representing bowel, bladder, and pelvic pain. Medical Necessity:   · Patient demonstrates GOOD rehab potential due to higher previous functional level. Reason for Services/Other Comments:  · Patient continues to require skilled intervention due to above mentioned deficits  . Total Duration:   PT Patient Time In/Time Out  Time In: 1200  Time Out: 1255    Rehabilitation Potential For Stated Goals: Good  Regarding [facility] therapy, I certify that the treatment plan above will be carried out by a therapist or under their direction.   Thank you for this referral,  Tosin Weber DPT     Referring Physician Signature: Fely Gunter,* _______________________________ Date _____________

## 2021-07-29 NOTE — PROGRESS NOTES
Lashawn Addison  : 1990  Primary: [address] at [address]  [phone]   [phone]        OUTPATIENT PHYSICAL THERAPY: Daily Treatment Note 2021  ICD-10: Treatment Diagnosis: R27.8 Lack of coordination (muscle incoordination), R35.0 Frequency of micturition, N39.46 Mixed incontinence (Urge and stress incontinence), R39.15 Urgency of Urination Excludes1: urge incontinence (N39.41,N39.46)     Precautions/Allergies:   Patient has no allergy information on record. TREATMENT PLAN:  Effective Dates: 21 to 10-29-21 (90 days). Frequency/Duration: 1 time a week for 90 Day(s) MEDICAL/REFERRING DIAGNOSIS:  Mixed incontinence [N39.46]   DATE OF ONSET:   REFERRING PHYSICIAN: Terrie Camara MD Orders: evaluate and treat  Return MD Appointment: -     Pre-treatment Symptoms/Complaints:  Patient reports she had to get a wart removed on the opening of her sphincter. She thought it was a skin tag. This sent her stress level through the roof. Her incontinence is worse. She was on the way to the bathroom and wet herself. Pain: Initial:   0 Post Session:  0/10   Medications Last Reviewed:  2021   Updated Objective Findings:    Ms. Berry Knutson is a 33 yo female referred to pelvic floor physical therapy (PFPT) by Terrie Camara,* 2/2 mixed incontinence. Pt reports that symptoms began . Patient was seen in physical therapy in the summer/fall of . She did back PT after pelvic floor. She has two bulging discs L4-5 with mild arthritis. She still has back pain but has her exercises to do. She found that she is extremely really weak. Also, muscles are tense and tight. She still had the incontinence issues while doing back PT. As the back pain is more prominent her incontinence is worse. She still has trouble making it to the bathroom as well as issues with sneezing.  Still has sudden urges to urinate. She is trying to do relaxation at home but having a hard time coordinating it with strengthening. She just started incorporating yoga into her routine.       Urinary: Frequency 12-14 x/day, 0 x/night. Positive for  urge urinary incontinence, urinary urgency, urinary frequency  Negative for  incomplete emptying. ,stress urinary incontinence, urinary hesitancy/intermittency, dysuria, hematuria, nocturia and nocturnal enuresis. Pt does not use pads for urinary protection; 0 pads per day (PPD). Fluid intake:  oz water/day; bladder irritants include: coffee in am, sometimes tea in afternoon. Pt does not limit fluid intake due to bladder control. Bowel: Frequency once a day. Positive for none. Negative for pain with bowel movement, pushing/straining with bowel movement, fecal incontinence, constipation and incomplete emptying. Pt does not use pads for bowel protection; 0 pads per day (PPD). Use of stool softeners or laxatives? NO  Sexual: Pt is sexually active with male partners. negative for dyspareunia. Contraception/birth control: yes. Pelvic Organ Prolapse/Pelvic Pain: Location: none. OB History: Number of pregnancies: 0 Number of vaginal deliveries: 0 Number of c-sections: 0. External Observations:  · Voluntary contraction: []? absent     [x]? present  · Involuntary contraction: []? absent     [x]? present  · Involuntary relaxation: []? absent     [x]? present  · Perineal descent at rest: []? absent     [x]? present  · Perineal descent with bearing: []? absent     [x]? present     Pelvic Floor Muscle (PFM) Assessment:  · Vaginal vault size: []? decreased     []? increased     [x]? WNL  · Muscle volume: []? decreased     [x]? WNL     []? Defect  · PFM tension: []? decreased     [x]? increased     []? WNL     Contraction ability:  Voluntary contraction: []? absent     [x]? weak     []? moderate      []? strong  Voluntary relaxation: []? absent     [x]? partial     []? complete   MMT: 2/5   Overflow: []? absent     []? min     []? mod     []? severe / Compensatory mm groups include      Tissue laxity test:  Anterior wall: [x]? minimum     []? moderate     []? severe      []? WNL  Posterior wall: [x]? minimum     []? moderate     []? severe      []? WNL   TREATMENT:   THERAPEUTIC EXERCISE: (55 minutes):  Exercises per grid below to improve mobility, strength, and coordination. Required minimal visual, verbal, and manual cues to promote proper body alignment, promote proper body posture, and promote proper body mechanics. Progressed resistance, range, and repetitions as indicated. All exercises performed with emphasis on kegel and breathing in order improve coordination, awareness and to minimize symptoms. Date:  4-27-21 Date:  5-13-21 Date:  5-19-21 DATE:  6-3-21 Date:  6-30-21 Date;  7-29-21   Activity/Exercise Parameters Parameters Parameters      Patient Education Discussed HEP and POC  Discussed dilators and given handouts Discussed keeping diary Discussed getting back to yoga    HEP Reviewed breaking up all therapies HEP into different days        3rd position bridge  x20 green band         90/90 heel taps    x20 B x20 B      Quadruped alt arm/leg  x20 B         Bench squat  10# x 20         Iso Abdominal   Unilateral holds 5 seconds to fatigue x 2 rounds    Unilateral holds with heel tap in 90/90 to fatigue x 2 rounds      Pelvic tilt   x10        Side plank   10 sec x 10 B        Chops   Blue band all directions Green band  2 directions to fatigue     Sidestep T band    Green band both directions to fatigue     Rows    Green band x 20       Extensions    Green band x 20       SKTC     1 minute holds   1 minute holds   Butterfly     1 minute holds   1 minute holds   Figure 4     1 minute holds   1 minute holds   Cat/Cow      3 minutes     Child's pose      5 minutes   LTR      x20        Pt gives verbal consent to internal  assessment/treatment no chaperon present. NEURO REEDUCATION: (0) to improve control and coordination of pelvic floor     Date:  5-6-21 Date:  6-9-21 Date:     Activity/Exercise Parameters Parameters Parameters   Biofeedback With sEMG was utilized for coordination of PFM. Supine, Sitting, Standing. PF and TA isolation, contraction, drops With sEMG was utilized for coordination of PFM. Supine, Sitting, Standing. PF and TA isolation with 2 leads                                            MANUAL THERAPY: (0 minutes) to improve soft tissue tone    Date Type Location Comments   7/29/2021 Internal assessment/treatment Via vaginal canal SP, CTM. C/R                                         (Used abbreviations: MET - muscle energy technique; SCS- Strain counter strain; CTM-Connective tissue mobilizations; CR- Contract/relax; SP- Sustained pressure, TrP-Trigger point release, IASTM- Instrument assisted soft tissue mobilizations, TDN-Trigger point dry needling)        [facility] Portal     Treatment/Session Summary:  Pt reports good understanding of plan of care, as well as prescribed home exercise program.  All questions were answered to pt's satisfaction. Pt was invited to call with any further questions or concerns. · Response to Treatment:  See recert  · Communication/Consultation:  None today  · Equipment provided today:  None today  · Recommendations/Intent for next treatment session: Next visit will focus on  manual therapy, coordination of PFM, review there ex.   Total Treatment Billable Duration: 55 minutes there ex  PT Patient Time In/Time Out  Time In: 1200  Time Out: Via Susannah De Santiago DPT    Future Appointments   Date Time Provider Everton Fishman   8/5/2021  1:00 PM Paty Antunez DPT [facility]

## 2021-08-04 NOTE — PROGRESS NOTES
Zana Contreras  : 1990  Primary: [address] at [address]  [phone]   [phone]        OUTPATIENT PHYSICAL THERAPY: Daily Treatment Note 2021  ICD-10: Treatment Diagnosis: R27.8 Lack of coordination (muscle incoordination), R35.0 Frequency of micturition, N39.46 Mixed incontinence (Urge and stress incontinence), R39.15 Urgency of Urination Excludes1: urge incontinence (N39.41,N39.46)     Precautions/Allergies:   Patient has no allergy information on record. TREATMENT PLAN:  Effective Dates: 21 to 10-29-21 (90 days). Frequency/Duration: 1 time a week for 90 Day(s) MEDICAL/REFERRING DIAGNOSIS:  Mixed incontinence [N39.46]   DATE OF ONSET:   REFERRING PHYSICIAN: Ana Rosa Johns MD Orders: evaluate and treat  Return MD Appointment: -     Pre-treatment Symptoms/Complaints:  Patient reports she hasn't gotten worse but hasn't gotten better. She started taking metamucil which has helped with bowel movements. Pain: Initial:   0 Post Session:  0/10   Medications Last Reviewed:  2021   Updated Objective Findings:    Ms. Karyle Lou is a 33 yo female referred to pelvic floor physical therapy (PFPT) by Ana Rosa Johns,* 2/2 mixed incontinence. Pt reports that symptoms began . Patient was seen in physical therapy in the summer/fall of . She did back PT after pelvic floor. She has two bulging discs L4-5 with mild arthritis. She still has back pain but has her exercises to do. She found that she is extremely really weak. Also, muscles are tense and tight. She still had the incontinence issues while doing back PT. As the back pain is more prominent her incontinence is worse. She still has trouble making it to the bathroom as well as issues with sneezing. Still has sudden urges to urinate.  She is trying to do relaxation at home but having a hard time coordinating it with strengthening. She just started incorporating yoga into her routine.       Urinary: Frequency 12-14 x/day, 0 x/night. Positive for  urge urinary incontinence, urinary urgency, urinary frequency  Negative for  incomplete emptying. ,stress urinary incontinence, urinary hesitancy/intermittency, dysuria, hematuria, nocturia and nocturnal enuresis. Pt does not use pads for urinary protection; 0 pads per day (PPD). Fluid intake:  oz water/day; bladder irritants include: coffee in am, sometimes tea in afternoon. Pt does not limit fluid intake due to bladder control. Bowel: Frequency once a day. Positive for none. Negative for pain with bowel movement, pushing/straining with bowel movement, fecal incontinence, constipation and incomplete emptying. Pt does not use pads for bowel protection; 0 pads per day (PPD). Use of stool softeners or laxatives? NO  Sexual: Pt is sexually active with male partners. negative for dyspareunia. Contraception/birth control: yes. Pelvic Organ Prolapse/Pelvic Pain: Location: none. OB History: Number of pregnancies: 0 Number of vaginal deliveries: 0 Number of c-sections: 0. External Observations:  · Voluntary contraction: []? absent     [x]? present  · Involuntary contraction: []? absent     [x]? present  · Involuntary relaxation: []? absent     [x]? present  · Perineal descent at rest: []? absent     [x]? present  · Perineal descent with bearing: []? absent     [x]? present     Pelvic Floor Muscle (PFM) Assessment:  · Vaginal vault size: []? decreased     []? increased     [x]? WNL  · Muscle volume: []? decreased     [x]? WNL     []? Defect  · PFM tension: []? decreased     [x]? increased     []? WNL     Contraction ability:  Voluntary contraction: []? absent     [x]? weak     []? moderate      []? strong  Voluntary relaxation: []? absent     [x]? partial     []? complete   MMT: 2/5   Overflow: []?  absent     []? min     []? mod     []? severe / Compensatory mm groups include      Tissue laxity test:  Anterior wall: [x]? minimum     []? moderate     []? severe      []? WNL  Posterior wall: [x]? minimum     []? moderate     []? severe      []? WNL   TREATMENT:   THERAPEUTIC EXERCISE: (55 minutes):  Exercises per grid below to improve mobility, strength, and coordination. Required minimal visual, verbal, and manual cues to promote proper body alignment, promote proper body posture, and promote proper body mechanics. Progressed resistance, range, and repetitions as indicated. All exercises performed with emphasis on kegel and breathing in order improve coordination, awareness and to minimize symptoms.     Date:  4-27-21 Date:  5-13-21 Date:  5-19-21 DATE:  6-3-21 Date:  6-30-21 Date;  7-29-21 Date:  8-5-21   Activity/Exercise Parameters Parameters Parameters       Patient Education Discussed HEP and POC  Discussed dilators and given handouts Discussed keeping diary Discussed getting back to yoga     HEP Reviewed breaking up all therapies HEP into different days         3rd position bridge  x20 green band       Blue band x 30   90/90 heel taps    x20 B x20 B    x20 B   Quadruped alt arm/leg  x20 B          Bench squat  10# x 20          Iso Abdominal   Unilateral holds 5 seconds to fatigue x 2 rounds    Unilateral holds with heel tap in 90/90 to fatigue x 2 rounds    To fatigue in 90/90    Pelvic tilt   x10         Side plank   10 sec x 10 B         Chops   Blue band all directions Green band  2 directions to fatigue      Sidestep T band    Green band both directions to fatigue      Rows    Green band x 20        Extensions    Green band x 20        SKTC     1 minute holds   1 minute holds 1 minute holds   Butterfly     1 minute holds   1 minute holds 1 minute holds   Figure 4     1 minute holds   1 minute holds 1 minute holds   Cat/Cow      3 minutes      Child's pose      5 minutes    LTR      x20      Clamshells       x30 blue band B        Pt gives verbal consent to internal  assessment/treatment no chaperon present. NEURO REEDUCATION: (0) to improve control and coordination of pelvic floor     Date:  5-6-21 Date:  6-9-21 Date:     Activity/Exercise Parameters Parameters Parameters   Biofeedback With sEMG was utilized for coordination of PFM. Supine, Sitting, Standing. PF and TA isolation, contraction, drops With sEMG was utilized for coordination of PFM. Supine, Sitting, Standing. PF and TA isolation with 2 leads                                            MANUAL THERAPY: (0 minutes) to improve soft tissue tone    Date Type Location Comments   8/5/2021 Internal assessment/treatment Via vaginal canal SP, CTM. C/R                                         (Used abbreviations: MET - muscle energy technique; SCS- Strain counter strain; CTM-Connective tissue mobilizations; CR- Contract/relax; SP- Sustained pressure, TrP-Trigger point release, IASTM- Instrument assisted soft tissue mobilizations, TDN-Trigger point dry needling)        [facility]     Treatment/Session Summary:  Pt reports good understanding of plan of care, as well as prescribed home exercise program.  All questions were answered to pt's satisfaction. Pt was invited to call with any further questions or concerns. · Response to Treatment:  Holding on internal until wound is healed. Able to tolerate there ex/stretching  · Communication/Consultation:  None today  · Equipment provided today:  None today  · Recommendations/Intent for next treatment session: Next visit will focus on  manual therapy, coordination of PFM, review there ex.   Total Treatment Billable Duration: 55 minutes there ex  PT Patient Time In/Time Out  Time In: 1300  Time Out: Mj Rai 42, DPT    Future Appointments   Date Time Provider Everton Fishman   8/19/2021  9:00 AM Linh Ochoa DPT [facility]   8/25/2021  2:00 PM Linh Ochoa DPT [facility]   9/1/2021  1:00 PM Linh Ochoa DPT [facility]

## 2021-08-05 ENCOUNTER — HOSPITAL ENCOUNTER (OUTPATIENT)
Dept: PHYSICAL THERAPY | Age: 31
Discharge: HOME | End: 2021-08-05
Payer: COMMERCIAL

## 2021-08-05 PROCEDURE — 97110 THERAPEUTIC EXERCISES: CPT

## 2021-08-09 ENCOUNTER — APPOINTMENT (RX ONLY)
Dept: URBAN - METROPOLITAN AREA CLINIC 349 | Facility: CLINIC | Age: 31
Setting detail: DERMATOLOGY
End: 2021-08-09

## 2021-08-09 DIAGNOSIS — L73.8 OTHER SPECIFIED FOLLICULAR DISORDERS: ICD-10-CM

## 2021-08-09 DIAGNOSIS — L71.8 OTHER ROSACEA: ICD-10-CM | Status: IMPROVED

## 2021-08-09 DIAGNOSIS — L82.0 INFLAMED SEBORRHEIC KERATOSIS: ICD-10-CM

## 2021-08-09 PROCEDURE — ? COUNSELING

## 2021-08-09 PROCEDURE — ? ADDITIONAL NOTES

## 2021-08-09 PROCEDURE — ? PRESCRIPTION

## 2021-08-09 PROCEDURE — ? PRESCRIPTION MEDICATION MANAGEMENT

## 2021-08-09 PROCEDURE — 99214 OFFICE O/P EST MOD 30 MIN: CPT | Mod: 25

## 2021-08-09 PROCEDURE — 17110 DESTRUCTION B9 LES UP TO 14: CPT

## 2021-08-09 PROCEDURE — ? BENIGN DESTRUCTION

## 2021-08-09 RX ORDER — MINOCYCLINE HYDROCHLORIDE 50 MG/1
CAPSULE ORAL
Qty: 60 | Refills: 3 | Status: ERX

## 2021-08-09 RX ORDER — MINOCYCLINE 15 MG/G
AEROSOL, FOAM TOPICAL
Qty: 1 | Refills: 6 | Status: ERX

## 2021-08-09 RX ORDER — IVERMECTIN 10 MG/G
CREAM TOPICAL
Qty: 1 | Refills: 6 | Status: ERX

## 2021-08-09 ASSESSMENT — LOCATION ZONE DERM
LOCATION ZONE: FACE
LOCATION ZONE: SCALP
LOCATION ZONE: HAND
LOCATION ZONE: FINGER

## 2021-08-09 ASSESSMENT — LOCATION DETAILED DESCRIPTION DERM
LOCATION DETAILED: RIGHT CENTRAL MALAR CHEEK
LOCATION DETAILED: LEFT CENTRAL MALAR CHEEK
LOCATION DETAILED: RIGHT SUPERIOR FOREHEAD
LOCATION DETAILED: LEFT RADIAL DORSAL HAND
LOCATION DETAILED: LEFT INFERIOR OCCIPITAL SCALP
LOCATION DETAILED: RIGHT PROXIMAL PALMAR INDEX FINGER

## 2021-08-09 ASSESSMENT — LOCATION SIMPLE DESCRIPTION DERM
LOCATION SIMPLE: SCALP
LOCATION SIMPLE: RIGHT CHEEK
LOCATION SIMPLE: RIGHT INDEX FINGER
LOCATION SIMPLE: LEFT CHEEK
LOCATION SIMPLE: LEFT HAND
LOCATION SIMPLE: RIGHT FOREHEAD

## 2021-08-09 NOTE — PROCEDURE: BENIGN DESTRUCTION
Render Note In Bullet Format When Appropriate: No
Consent: The patient's consent was obtained including but not limited to risks of crusting, scabbing, blistering, scarring, darker or lighter pigmentary change, recurrence, incomplete removal and infection.
Detail Level: Detailed
Medical Necessity Information: It is in your best interest to select a reason for this procedure from the list below. All of these items fulfill various CMS LCD requirements except the new and changing color options.
Medical Necessity Clause: This procedure was medically necessary because the lesions that were treated were:
Anesthesia Volume In Cc: 0
Post-Care Instructions: I reviewed with the patient in detail post-care instructions. Patient is to wear sunprotection, and avoid picking at any of the treated lesions. Pt may apply Vaseline to crusted or scabbing areas.
Include Z78.9 (Other Specified Conditions Influencing Health Status) As An Associated Diagnosis?: Yes

## 2021-08-09 NOTE — PROCEDURE: ADDITIONAL NOTES
Render Risk Assessment In Note?: no
Detail Level: Detailed
Additional Notes: Patient declines  treatment today
Additional Notes: Patient was advised there is not a generic for the topicals
Additional Notes: Patient questioned Dr Ruiz about washing her hands often and states she was diagnosed with OCD and she does feel like she spends at least 5 minutes washing her hands several times a day.  Dr Ruiz advised her that she can strip the oils from excessive hand washing but to use moisturizer after she washes her hands.

## 2021-08-09 NOTE — PROCEDURE: PRESCRIPTION MEDICATION MANAGEMENT
Detail Level: Zone
Continue Regimen: Zilxi\\nSoolantra\\nMinocycline take one tablet po twice daily but taper off and use when flaring
Render In Strict Bullet Format?: No

## 2021-08-19 ENCOUNTER — HOSPITAL ENCOUNTER (OUTPATIENT)
Dept: PHYSICAL THERAPY | Age: 31
Discharge: HOME | End: 2021-08-19
Payer: COMMERCIAL

## 2021-08-19 PROCEDURE — 97140 MANUAL THERAPY 1/> REGIONS: CPT

## 2021-08-19 NOTE — PROGRESS NOTES
Imer Farrell  : 1990  Primary: [address] at [address] [facility], [address] [place] [address]  [phone]   [phone]        OUTPATIENT PHYSICAL THERAPY: Daily Treatment Note 2021  ICD-10: Treatment Diagnosis: R27.8 Lack of coordination (muscle incoordination), R35.0 Frequency of micturition, N39.46 Mixed incontinence (Urge and stress incontinence), R39.15 Urgency of Urination Excludes1: urge incontinence (N39.41,N39.46)     Precautions/Allergies:   Patient has no allergy information on record. TREATMENT PLAN:  Effective Dates: 21 to 10-29-21 (90 days). Frequency/Duration: 1 time a week for 90 Day(s) MEDICAL/REFERRING DIAGNOSIS:  Mixed incontinence [N39.46]   DATE OF ONSET:   REFERRING PHYSICIAN: Rohan Baez MD Orders: evaluate and treat  Return MD Appointment: -     Pre-treatment Symptoms/Complaints:  Patient reports there isn't a time where she isn't leaking on the way to the bathroom. Her stress level is worse. She hasn't gotten the dilators. Pain: Initial:   0 Post Session:  0/10   Medications Last Reviewed:  2021   Updated Objective Findings:    Ms. Hina Phelps is a 31 yo female referred to pelvic floor physical therapy (PFPT) by Rohan Baez,* 2/2 mixed incontinence. Pt reports that symptoms began . Patient was seen in physical therapy in the summer/fall of . She did back PT after pelvic floor. She has two bulging discs L4-5 with mild arthritis. She still has back pain but has her exercises to do. She found that she is extremely really weak. Also, muscles are tense and tight. She still had the incontinence issues while doing back PT. As the back pain is more prominent her incontinence is worse. She still has trouble making it to the bathroom as well as issues with sneezing. Still has sudden urges to urinate.  She is trying to do relaxation at home but having a hard time coordinating it with strengthening. She just started incorporating yoga into her routine.       Urinary: Frequency 12-14 x/day, 0 x/night. Positive for  urge urinary incontinence, urinary urgency, urinary frequency  Negative for  incomplete emptying. ,stress urinary incontinence, urinary hesitancy/intermittency, dysuria, hematuria, nocturia and nocturnal enuresis. Pt does not use pads for urinary protection; 0 pads per day (PPD). Fluid intake:  oz water/day; bladder irritants include: coffee in am, sometimes tea in afternoon. Pt does not limit fluid intake due to bladder control. Bowel: Frequency once a day. Positive for none. Negative for pain with bowel movement, pushing/straining with bowel movement, fecal incontinence, constipation and incomplete emptying. Pt does not use pads for bowel protection; 0 pads per day (PPD). Use of stool softeners or laxatives? NO  Sexual: Pt is sexually active with male partners. negative for dyspareunia. Contraception/birth control: yes. Pelvic Organ Prolapse/Pelvic Pain: Location: none. OB History: Number of pregnancies: 0 Number of vaginal deliveries: 0 Number of c-sections: 0. External Observations:  · Voluntary contraction: []? absent     [x]? present  · Involuntary contraction: []? absent     [x]? present  · Involuntary relaxation: []? absent     [x]? present  · Perineal descent at rest: []? absent     [x]? present  · Perineal descent with bearing: []? absent     [x]? present     Pelvic Floor Muscle (PFM) Assessment:  · Vaginal vault size: []? decreased     []? increased     [x]? WNL  · Muscle volume: []? decreased     [x]? WNL     []? Defect  · PFM tension: []? decreased     [x]? increased     []? WNL     Contraction ability:  Voluntary contraction: []? absent     [x]? weak     []? moderate      []? strong  Voluntary relaxation: []? absent     [x]? partial     []? complete   MMT: 2/5   Overflow: []?  absent     []? min     []? mod     []? severe / Compensatory mm groups include      Tissue laxity test:  Anterior wall: [x]? minimum     []? moderate     []? severe      []? WNL  Posterior wall: [x]? minimum     []? moderate     []? severe      []? WNL   TREATMENT:   THERAPEUTIC EXERCISE: ( minutes):  Exercises per grid below to improve mobility, strength, and coordination. Required minimal visual, verbal, and manual cues to promote proper body alignment, promote proper body posture, and promote proper body mechanics. Progressed resistance, range, and repetitions as indicated. All exercises performed with emphasis on kegel and breathing in order improve coordination, awareness and to minimize symptoms.     Date:  4-27-21 Date:  5-13-21 Date:  5-19-21 DATE:  6-3-21 Date:  6-30-21 Date;  7-29-21 Date:  8-5-21   Activity/Exercise Parameters Parameters Parameters       Patient Education Discussed HEP and POC  Discussed dilators and given handouts Discussed keeping diary Discussed getting back to yoga     HEP Reviewed breaking up all therapies HEP into different days         3rd position bridge  x20 green band       Blue band x 30   90/90 heel taps    x20 B x20 B    x20 B   Quadruped alt arm/leg  x20 B          Bench squat  10# x 20          Iso Abdominal   Unilateral holds 5 seconds to fatigue x 2 rounds    Unilateral holds with heel tap in 90/90 to fatigue x 2 rounds    To fatigue in 90/90    Pelvic tilt   x10         Side plank   10 sec x 10 B         Chops   Blue band all directions Green band  2 directions to fatigue      Sidestep T band    Green band both directions to fatigue      Rows    Green band x 20        Extensions    Green band x 20        SKTC     1 minute holds   1 minute holds 1 minute holds   Butterfly     1 minute holds   1 minute holds 1 minute holds   Figure 4     1 minute holds   1 minute holds 1 minute holds   Cat/Cow      3 minutes      Child's pose      5 minutes    LTR      x20      Clamshells       x30 blue band B        Pt gives verbal consent to internal  assessment/treatment no chaperon present. NEURO REEDUCATION: (0) to improve control and coordination of pelvic floor     Date:  5-6-21 Date:  6-9-21 Date:     Activity/Exercise Parameters Parameters Parameters   Biofeedback With sEMG was utilized for coordination of PFM. Supine, Sitting, Standing. PF and TA isolation, contraction, drops With sEMG was utilized for coordination of PFM. Supine, Sitting, Standing. PF and TA isolation with 2 leads                                            MANUAL THERAPY: (45 minutes) to improve soft tissue tone    Date Type Location Comments   8/19/2021 Internal assessment/treatment Via vaginal canal SP, CTM. C/R                                         (Used abbreviations: MET - muscle energy technique; SCS- Strain counter strain; CTM-Connective tissue mobilizations; CR- Contract/relax; SP- Sustained pressure, TrP-Trigger point release, IASTM- Instrument assisted soft tissue mobilizations, TDN-Trigger point dry needling)        [facility] Portal     Treatment/Session Summary:  Pt reports good understanding of plan of care, as well as prescribed home exercise program.  All questions were answered to pt's satisfaction. Pt was invited to call with any further questions or concerns. · Response to Treatment:  Patient had more tightness to pelvic floor today due to increased stress. · Communication/Consultation:  None today  · Equipment provided today:  None today  · Recommendations/Intent for next treatment session: Next visit will focus on  manual therapy, coordination of PFM, review there ex.   Total Treatment Billable Duration: 45 minutes manual  PT Patient Time In/Time Out  Time In: 0900  Time Out: Mj Wright, CURTIS    Future Appointments   Date Time Provider Everton Fishman   8/25/2021  2:00 PM Paty Antunez DPT [facility]   9/1/2021  1:00 PM Paty Antunez DPT [facility]

## 2021-08-20 ENCOUNTER — APPOINTMENT (RX ONLY)
Dept: URBAN - METROPOLITAN AREA CLINIC 349 | Facility: CLINIC | Age: 31
Setting detail: DERMATOLOGY
End: 2021-08-20

## 2021-08-20 DIAGNOSIS — L82.1 OTHER SEBORRHEIC KERATOSIS: ICD-10-CM

## 2021-08-20 DIAGNOSIS — D18.0 HEMANGIOMA: ICD-10-CM

## 2021-08-20 DIAGNOSIS — D22 MELANOCYTIC NEVI: ICD-10-CM

## 2021-08-20 DIAGNOSIS — B07.8 OTHER VIRAL WARTS: ICD-10-CM

## 2021-08-20 DIAGNOSIS — L73.8 OTHER SPECIFIED FOLLICULAR DISORDERS: ICD-10-CM

## 2021-08-20 DIAGNOSIS — L72.0 EPIDERMAL CYST: ICD-10-CM

## 2021-08-20 PROBLEM — D18.01 HEMANGIOMA OF SKIN AND SUBCUTANEOUS TISSUE: Status: ACTIVE | Noted: 2021-08-20

## 2021-08-20 PROBLEM — D22.71 MELANOCYTIC NEVI OF RIGHT LOWER LIMB, INCLUDING HIP: Status: ACTIVE | Noted: 2021-08-20

## 2021-08-20 PROCEDURE — ? BENIGN DESTRUCTION

## 2021-08-20 PROCEDURE — ? ADDITIONAL NOTES

## 2021-08-20 PROCEDURE — ? COUNSELING

## 2021-08-20 PROCEDURE — 99213 OFFICE O/P EST LOW 20 MIN: CPT | Mod: 25

## 2021-08-20 PROCEDURE — 17110 DESTRUCTION B9 LES UP TO 14: CPT

## 2021-08-20 ASSESSMENT — LOCATION SIMPLE DESCRIPTION DERM
LOCATION SIMPLE: SUPERIOR FOREHEAD
LOCATION SIMPLE: RIGHT UPPER BACK
LOCATION SIMPLE: RIGHT HAND
LOCATION SIMPLE: RIGHT PRETIBIAL REGION
LOCATION SIMPLE: LEFT CHEEK
LOCATION SIMPLE: LEFT HAND
LOCATION SIMPLE: RIGHT INDEX FINGER
LOCATION SIMPLE: RIGHT CHEEK

## 2021-08-20 ASSESSMENT — LOCATION ZONE DERM
LOCATION ZONE: LEG
LOCATION ZONE: FINGER
LOCATION ZONE: FACE
LOCATION ZONE: TRUNK
LOCATION ZONE: HAND

## 2021-08-20 ASSESSMENT — LOCATION DETAILED DESCRIPTION DERM
LOCATION DETAILED: RIGHT PROXIMAL PALMAR INDEX FINGER
LOCATION DETAILED: SUPERIOR MID FOREHEAD
LOCATION DETAILED: RIGHT INFERIOR MEDIAL MALAR CHEEK
LOCATION DETAILED: RIGHT MEDIAL DISTAL PRETIBIAL REGION
LOCATION DETAILED: RIGHT SUPERIOR UPPER BACK
LOCATION DETAILED: RIGHT DISTAL PALMAR INDEX FINGER
LOCATION DETAILED: RIGHT RADIAL DORSAL HAND
LOCATION DETAILED: RIGHT HYPOTHENAR EMINENCE
LOCATION DETAILED: LEFT INFERIOR MEDIAL MALAR CHEEK
LOCATION DETAILED: RIGHT INDEX FINGERTIP
LOCATION DETAILED: LEFT RADIAL DORSAL HAND

## 2021-08-20 NOTE — PROCEDURE: ADDITIONAL NOTES
Detail Level: Detailed
Additional Notes: PAtient was advised that she does not need to wear bandaids as the area is not bleeding at this time
Render Risk Assessment In Note?: no

## 2021-08-20 NOTE — PROCEDURE: BENIGN DESTRUCTION
Add 52 Modifier (Optional): no
Anesthesia Volume In Cc: 0
Medical Necessity Clause: This procedure was medically necessary because the lesions that were treated were:
Detail Level: Detailed
Medical Necessity Information: It is in your best interest to select a reason for this procedure from the list below. All of these items fulfill various CMS LCD requirements except the new and changing color options.
Include Z78.9 (Other Specified Conditions Influencing Health Status) As An Associated Diagnosis?: Yes
Post-Care Instructions: I reviewed with the patient in detail post-care instructions. Patient is to wear sunprotection, and avoid picking at any of the treated lesions. Pt may apply Vaseline to crusted or scabbing areas.
Consent: The patient's consent was obtained including but not limited to risks of crusting, scabbing, blistering, scarring, darker or lighter pigmentary change, recurrence, incomplete removal and infection.

## 2021-08-25 ENCOUNTER — HOSPITAL ENCOUNTER (OUTPATIENT)
Dept: PHYSICAL THERAPY | Age: 31
Discharge: HOME | End: 2021-08-25
Payer: COMMERCIAL

## 2021-08-25 NOTE — PROGRESS NOTES
Selena Massey  : 1990  Primary: [address] at [address]  [phone]   [phone]                  DATE: 2021    Patient called to cancel appointment  today due to. Will plan to follow up on next scheduled visit.     ARISTIDES GaldamezT

## 2021-09-16 ENCOUNTER — APPOINTMENT (RX ONLY)
Dept: URBAN - METROPOLITAN AREA CLINIC 349 | Facility: CLINIC | Age: 31
Setting detail: DERMATOLOGY
End: 2021-09-16

## 2021-09-16 DIAGNOSIS — B07.8 OTHER VIRAL WARTS: ICD-10-CM | Status: RESOLVING

## 2021-09-16 PROBLEM — D23.39 OTHER BENIGN NEOPLASM OF SKIN OF OTHER PARTS OF FACE: Status: ACTIVE | Noted: 2021-09-16

## 2021-09-16 PROCEDURE — 99213 OFFICE O/P EST LOW 20 MIN: CPT

## 2021-09-16 PROCEDURE — ? COUNSELING

## 2021-09-16 PROCEDURE — ? DEFER

## 2021-09-16 ASSESSMENT — LOCATION DETAILED DESCRIPTION DERM: LOCATION DETAILED: RIGHT INDEX FINGERTIP

## 2021-09-16 ASSESSMENT — LOCATION SIMPLE DESCRIPTION DERM: LOCATION SIMPLE: RIGHT INDEX FINGER

## 2021-09-16 ASSESSMENT — LOCATION ZONE DERM: LOCATION ZONE: FINGER

## 2021-09-16 NOTE — PROCEDURE: DEFER
Procedure To Be Performed At Next Visit: Electrodesiccation
Introduction Text (Please End With A Colon): .
Detail Level: Detailed

## 2021-09-21 ENCOUNTER — HOSPITAL ENCOUNTER (OUTPATIENT)
Dept: PHYSICAL THERAPY | Age: 31
Discharge: HOME | End: 2021-09-21

## 2021-09-21 ENCOUNTER — APPOINTMENT (RX ONLY)
Dept: URBAN - METROPOLITAN AREA CLINIC 349 | Facility: CLINIC | Age: 31
Setting detail: DERMATOLOGY
End: 2021-09-21

## 2021-09-21 DIAGNOSIS — B07.8 OTHER VIRAL WARTS: ICD-10-CM | Status: INADEQUATELY CONTROLLED

## 2021-09-21 DIAGNOSIS — D22 MELANOCYTIC NEVI: ICD-10-CM

## 2021-09-21 PROBLEM — D22.72 MELANOCYTIC NEVI OF LEFT LOWER LIMB, INCLUDING HIP: Status: ACTIVE | Noted: 2021-09-21

## 2021-09-21 PROBLEM — D22.71 MELANOCYTIC NEVI OF RIGHT LOWER LIMB, INCLUDING HIP: Status: ACTIVE | Noted: 2021-09-21

## 2021-09-21 PROCEDURE — 99212 OFFICE O/P EST SF 10 MIN: CPT | Mod: 25

## 2021-09-21 PROCEDURE — ? BENIGN DESTRUCTION

## 2021-09-21 PROCEDURE — 17110 DESTRUCTION B9 LES UP TO 14: CPT

## 2021-09-21 PROCEDURE — ? COUNSELING

## 2021-09-21 PROCEDURE — ? OTHER

## 2021-09-21 ASSESSMENT — LOCATION SIMPLE DESCRIPTION DERM
LOCATION SIMPLE: RIGHT INDEX FINGER
LOCATION SIMPLE: RIGHT PRETIBIAL REGION
LOCATION SIMPLE: LEFT PRETIBIAL REGION

## 2021-09-21 ASSESSMENT — LOCATION DETAILED DESCRIPTION DERM
LOCATION DETAILED: RIGHT PROXIMAL PRETIBIAL REGION
LOCATION DETAILED: LEFT PROXIMAL PRETIBIAL REGION
LOCATION DETAILED: RIGHT PROXIMAL PALMAR INDEX FINGER

## 2021-09-21 ASSESSMENT — LOCATION ZONE DERM
LOCATION ZONE: LEG
LOCATION ZONE: FINGER

## 2021-09-21 NOTE — PROCEDURE: OTHER
Detail Level: Generalized
Render Risk Assessment In Note?: yes
Other (Free Text): Discussed her OCD, reassured patient she can’t spread it easily and the nature of warts in general.
Note Text (......Xxx Chief Complaint.): This diagnosis correlates with the

## 2021-09-21 NOTE — PROGRESS NOTES
Micaela Dowling  : 1990  Primary: [address] at [address] [facility], [address] [place] [address]  [phone]   [phone]                  DATE: 2021    Patient called to cancel appointment  today . Will plan to follow up on next scheduled visit.     Kevin Umana DPT

## 2021-09-21 NOTE — PROCEDURE: BENIGN DESTRUCTION
Treatment Number (Will Not Render If 0): 0
Render Note In Bullet Format When Appropriate: No
Post-Care Instructions: I reviewed with the patient in detail post-care instructions. Patient is to wear sunprotection, and avoid picking at any of the treated lesions. Pt may apply Vaseline to crusted or scabbing areas.
Medical Necessity Information: It is in your best interest to select a reason for this procedure from the list below. All of these items fulfill various CMS LCD requirements except the new and changing color options.
Include Z78.9 (Other Specified Conditions Influencing Health Status) As An Associated Diagnosis?: Yes
Medical Necessity Clause: This procedure was medically necessary because the lesions that were treated were:
Consent: The patient's consent was obtained including but not limited to risks of crusting, scabbing, blistering, scarring, darker or lighter pigmentary change, recurrence, incomplete removal and infection.
Detail Level: Detailed

## 2021-10-12 ENCOUNTER — HOSPITAL ENCOUNTER (OUTPATIENT)
Dept: PHYSICAL THERAPY | Age: 31
Discharge: HOME | End: 2021-10-12
Payer: COMMERCIAL

## 2021-10-12 NOTE — PROGRESS NOTES
Cecil Zhang  : 1990  Primary: [address] at [address]  [phone]   [phone]                  DATE: 10/12/2021    Patient called to cancel  today due to work conflict. Will plan to follow up on next scheduled visit.     ARISTIDES CardenasT

## 2021-10-15 ENCOUNTER — APPOINTMENT (RX ONLY)
Dept: URBAN - METROPOLITAN AREA CLINIC 349 | Facility: CLINIC | Age: 31
Setting detail: DERMATOLOGY
End: 2021-10-15

## 2021-10-15 DIAGNOSIS — L71.8 OTHER ROSACEA: ICD-10-CM

## 2021-10-15 DIAGNOSIS — B07.8 OTHER VIRAL WARTS: ICD-10-CM | Status: INADEQUATELY CONTROLLED

## 2021-10-15 PROCEDURE — ? PRESCRIPTION

## 2021-10-15 PROCEDURE — 99214 OFFICE O/P EST MOD 30 MIN: CPT | Mod: 25

## 2021-10-15 PROCEDURE — ? BENIGN DESTRUCTION

## 2021-10-15 PROCEDURE — 17110 DESTRUCTION B9 LES UP TO 14: CPT

## 2021-10-15 PROCEDURE — ? PRESCRIPTION MEDICATION MANAGEMENT

## 2021-10-15 PROCEDURE — ? COUNSELING

## 2021-10-15 PROCEDURE — ? ADDITIONAL NOTES

## 2021-10-15 RX ORDER — MINOCYCLINE HYDROCHLORIDE 50 MG/1
CAPSULE ORAL
Qty: 60 | Refills: 3 | Status: CANCELLED
Stop reason: ENTERED-IN-ERROR

## 2021-10-15 ASSESSMENT — LOCATION ZONE DERM
LOCATION ZONE: SCALP
LOCATION ZONE: HAND
LOCATION ZONE: FACE

## 2021-10-15 ASSESSMENT — LOCATION SIMPLE DESCRIPTION DERM
LOCATION SIMPLE: LEFT CHEEK
LOCATION SIMPLE: RIGHT HAND
LOCATION SIMPLE: SCALP
LOCATION SIMPLE: RIGHT CHEEK

## 2021-10-15 ASSESSMENT — LOCATION DETAILED DESCRIPTION DERM
LOCATION DETAILED: RIGHT CENTRAL MALAR CHEEK
LOCATION DETAILED: RIGHT INFERIOR CENTRAL MALAR CHEEK
LOCATION DETAILED: RIGHT HYPOTHENAR EMINENCE
LOCATION DETAILED: LEFT CENTRAL MALAR CHEEK
LOCATION DETAILED: LEFT INFERIOR OCCIPITAL SCALP
LOCATION DETAILED: LEFT INFERIOR CENTRAL MALAR CHEEK

## 2021-10-15 NOTE — PROCEDURE: BENIGN DESTRUCTION
Add 52 Modifier (Optional): no
Include Z78.9 (Other Specified Conditions Influencing Health Status) As An Associated Diagnosis?: Yes
Detail Level: Detailed
Medical Necessity Information: It is in your best interest to select a reason for this procedure from the list below. All of these items fulfill various CMS LCD requirements except the new and changing color options.
Consent: The patient's consent was obtained including but not limited to risks of crusting, scabbing, blistering, scarring, darker or lighter pigmentary change, recurrence, incomplete removal and infection.
Post-Care Instructions: I reviewed with the patient in detail post-care instructions. Patient is to wear sunprotection, and avoid picking at any of the treated lesions. Pt may apply Vaseline to crusted or scabbing areas.
Medical Necessity Clause: This procedure was medically necessary because the lesions that were treated were:
Treatment Number (Will Not Render If 0): 0

## 2021-10-15 NOTE — PROCEDURE: PRESCRIPTION MEDICATION MANAGEMENT
Detail Level: Zone
Continue Regimen: Zilxi\\nSoolantra\\nMinocycline take one tablet po twice daily but taper off and use when flaring
Render In Strict Bullet Format?: No
Continue Regimen: Soolantra and Zilxi

## 2021-10-15 NOTE — PROCEDURE: ADDITIONAL NOTES
Render Risk Assessment In Note?: no
Additional Notes: Patient was advised there is not a generic for the topicals
Detail Level: Detailed

## 2021-10-21 ENCOUNTER — HOSPITAL ENCOUNTER (OUTPATIENT)
Dept: PHYSICAL THERAPY | Age: 31
Discharge: HOME | End: 2021-10-21
Payer: COMMERCIAL

## 2021-10-21 PROCEDURE — 97110 THERAPEUTIC EXERCISES: CPT

## 2021-10-21 NOTE — PROGRESS NOTES
Zan Fuel  : 1990  Primary: [address] at [address]  [phone]   [phone]        OUTPATIENT PHYSICAL THERAPY: Daily Treatment Note 10/21/2021  ICD-10: Treatment Diagnosis: R27.8 Lack of coordination (muscle incoordination), R35.0 Frequency of micturition, N39.46 Mixed incontinence (Urge and stress incontinence), R39.15 Urgency of Urination Excludes1: urge incontinence (N39.41,N39.46)     Precautions/Allergies:   Patient has no allergy information on record. TREATMENT PLAN:  Effective Dates: 10-21-21 to 21 (90 days). Frequency/Duration: 1 time a week for 90 Day(s) MEDICAL/REFERRING DIAGNOSIS:  Mixed incontinence [N39.46]   DATE OF ONSET:   REFERRING PHYSICIAN: Cassius Mata MD Orders: evaluate and treat  Return MD Appointment: -     Pre-treatment Symptoms/Complaints:  Patient reports when she went to [place] her pelvic floor was ok. She thinks a lot of the stress is coming from home life which causes pelvic floor problems. She has started therapy for her OCD. She is doing ERP treatment. At work she has a program called [facility] and she is with a therapist working on her back. She is still status quo on pelvic floor. It all depends on her environment. Pain: Initial:   0 Post Session:  0/10   Medications Last Reviewed:  10/21/2021   Updated Objective Findings:    Ms. Mary Duffy is a 31 yo female referred to pelvic floor physical therapy (PFPT) by Cassius Mata,* 2/2 mixed incontinence. Pt reports that symptoms began . Patient was seen in physical therapy in the summer/fall of . She did back PT after pelvic floor. She has two bulging discs L4-5 with mild arthritis. She still has back pain but has her exercises to do. She found that she is extremely really weak. Also, muscles are tense and tight.  She still had the incontinence issues while doing back PT. As the back pain is more prominent her incontinence is worse. She still has trouble making it to the bathroom as well as issues with sneezing. Still has sudden urges to urinate. She is trying to do relaxation at home but having a hard time coordinating it with strengthening. She just started incorporating yoga into her routine.       Urinary: Frequency 12-14 x/day, 0 x/night. Positive for  urge urinary incontinence, urinary urgency, urinary frequency  Negative for  incomplete emptying. ,stress urinary incontinence, urinary hesitancy/intermittency, dysuria, hematuria, nocturia and nocturnal enuresis. Pt does not use pads for urinary protection; 0 pads per day (PPD). Fluid intake:  oz water/day; bladder irritants include: coffee in am, sometimes tea in afternoon. Pt does not limit fluid intake due to bladder control. Bowel: Frequency once a day. Positive for none. Negative for pain with bowel movement, pushing/straining with bowel movement, fecal incontinence, constipation and incomplete emptying. Pt does not use pads for bowel protection; 0 pads per day (PPD). Use of stool softeners or laxatives? NO  Sexual: Pt is sexually active with male partners. negative for dyspareunia. Contraception/birth control: yes. Pelvic Organ Prolapse/Pelvic Pain: Location: none. OB History: Number of pregnancies: 0 Number of vaginal deliveries: 0 Number of c-sections: 0. External Observations:  · Voluntary contraction: []? absent     [x]? present  · Involuntary contraction: []? absent     [x]? present  · Involuntary relaxation: []? absent     [x]? present  · Perineal descent at rest: []? absent     [x]? present  · Perineal descent with bearing: []? absent     [x]? present     Pelvic Floor Muscle (PFM) Assessment:  · Vaginal vault size: []? decreased     []? increased     [x]? WNL  · Muscle volume: []? decreased     [x]? WNL     []? Defect  · PFM tension: []? decreased     [x]? increased     []?  WNL     Contraction ability:  Voluntary contraction: []? absent     [x]? weak     []? moderate      []? strong  Voluntary relaxation: []? absent     [x]? partial     []? complete   MMT: 2/5   Overflow: []? absent     []? min     []? mod     []? severe / Compensatory mm groups include      Tissue laxity test:  Anterior wall: [x]? minimum     []? moderate     []? severe      []? WNL  Posterior wall: [x]? minimum     []? moderate     []? severe      []? WNL   TREATMENT:   THERAPEUTIC EXERCISE: (25 minutes):  Exercises per grid below to improve mobility, strength, and coordination. Required minimal visual, verbal, and manual cues to promote proper body alignment, promote proper body posture, and promote proper body mechanics. Progressed resistance, range, and repetitions as indicated. All exercises performed with emphasis on kegel and breathing in order improve coordination, awareness and to minimize symptoms.     Date:  4-27-21 Date:  5-13-21 Date:  5-19-21 DATE:  6-3-21 Date:  6-30-21 Date;  7-29-21 Date:  8-5-21 Date:  10-21-21   Activity/Exercise Parameters Parameters Parameters        Patient Education Discussed HEP and POC  Discussed dilators and given handouts Discussed keeping diary Discussed getting back to yoga      HEP Reviewed breaking up all therapies HEP into different days          3rd position bridge  x20 green band       Blue band x 30    90/90 heel taps    x20 B x20 B    x20 B    Quadruped alt arm/leg  x20 B           Bench squat  10# x 20           Iso Abdominal   Unilateral holds 5 seconds to fatigue x 2 rounds    Unilateral holds with heel tap in 90/90 to fatigue x 2 rounds    To fatigue in 90/90     Pelvic tilt   x10          Side plank   10 sec x 10 B          Chops   Blue band all directions Green band  2 directions to fatigue       Sidestep T band    Green band both directions to fatigue       Rows    Green band x 20         Extensions    Green band x 20         SKTC     1 minute holds   1 minute holds 1 minute holds    Butterfly     1 minute holds   1 minute holds 1 minute holds 1 minute holds   Figure 4     1 minute holds   1 minute holds 1 minute holds 1 minute holds   Cat/Cow      3 minutes       Child's pose      5 minutes  1 minute holds     LTR      x20       Clamshells       x30 blue band B      Breathing        x5 minutes, discussed and performed       Pt gives verbal consent to internal  assessment/treatment no chaperon present. NEURO REEDUCATION: (0) to improve control and coordination of pelvic floor     Date:  5-6-21 Date:  6-9-21 Date:     Activity/Exercise Parameters Parameters Parameters   Biofeedback With sEMG was utilized for coordination of PFM. Supine, Sitting, Standing. PF and TA isolation, contraction, drops With sEMG was utilized for coordination of PFM. Supine, Sitting, Standing. PF and TA isolation with 2 leads                                            MANUAL THERAPY: ( minutes) to improve soft tissue tone    Date Type Location Comments   10/21/2021 Internal assessment/treatment Via vaginal canal SP, CTM. C/R                                         (Used abbreviations: MET - muscle energy technique; SCS- Strain counter strain; CTM-Connective tissue mobilizations; CR- Contract/relax; SP- Sustained pressure, TrP-Trigger point release, IASTM- Instrument assisted soft tissue mobilizations, TDN-Trigger point dry needling)        [facility] Portal     Treatment/Session Summary:  Pt reports good understanding of plan of care, as well as prescribed home exercise program.  All questions were answered to pt's satisfaction. Pt was invited to call with any further questions or concerns. · Response to Treatment:  See recert   · Communication/Consultation:  None today  · Equipment provided today:  None today  · Recommendations/Intent for next treatment session: Next visit will focus on  manual therapy, coordination of PFM, review there ex.   Total Treatment Billable Duration: 25 minutes there ex  PT Patient Time In/Time Out  Time In: 0830  Time Out: 0900  Diana Manzanares, DPT    No future appointments.

## 2021-10-21 NOTE — THERAPY RECERTIFICATION
Washington Dixon  : 1990  Primary: [address] at [address]  [phone]   [phone]        OUTPATIENT PHYSICAL THERAPY:Recertification    ICD-10: Treatment Diagnosis: R27.8 Lack of coordination (muscle incoordination), R35.0 Frequency of micturition, N39.46 Mixed incontinence (Urge and stress incontinence), R39.15 Urgency of Urination Excludes1: urge incontinence (N39.41,N39.46)     Precautions/Allergies:   Patient has no allergy information on record. TREATMENT PLAN:  Effective Dates: 10-21-21 to 21 (90 days). Frequency/Duration: 1 time a week for 90 Day(s) MEDICAL/REFERRING DIAGNOSIS:  Mixed incontinence [N39.46]   DATE OF ONSET:   REFERRING PHYSICIAN: Robyn Villaseñor MD Orders: evaluate and treat  Return MD Appointment: -    REASSESSMENT: Ms. Nelda Figueroa has been seen in skilled PT from 21 to 10-21-21. Patient has attended 10 out of 16 scheduled visits, with 5 cancellation(s) and 1 no shows. Treatment has emphasized manual therapy, down training, biofeedback, and coordination of PFM/core. Patient has made improvements in better control of core and pelvic floor, however continues to demonstrate deficits in relaxation of PFM contributing to urinary leakage and urgency. Patient will be working with a therapist on her OCD. Pt has progressed with goals as indicated below at this point and will continue to benefit from skilled PT in order to achieve remaining goals and maximize functional outcomes in order to return to [facility]. REASSESSMENT: Ms. Nelda Figueroa has been seen in skilled PT from 21 to 21. Patient has attended 7 out of 10 scheduled visits, with 2 cancellation(s) and 1 no shows. Treatment has emphasized biofeedback, manual therapy, coordintion of PFM/core.  Patient has made improvements in better control of core and pelvic floor, and has been more consistent with stress relief and yoga however continues to demonstrate deficits in overactivity of PFM contributing to urinary leakage and urgency. Pt has progressed with goals as indicated below at this point and will continue to benefit from skilled PT in order to achieve remaining goals and maximize functional outcomes in order to return to [facility]. INITIAL ASSESSMENT:  Ms. Keith Ingram presents with pelvic floor muscle over activity,  lack of coordination, timing, awareness, endurance and strength. Patient is unable to isloate pelvic floor muscles from abdominals, contributing to over activity and incontinence. She also has a history of core and hip weakness from bulging discs in her low back resulting in further over activity of PFM. Patient would benefit from skilled physical therapy to address her deficits and maximize her function. PROBLEM LIST (Impacting functional limitations):  Decreased Flexibility/Joint Mobility  Decreased [place] with Home Exercise Program  Decreaesd coordination  Decreased strength of pelvic floor which limits bladder control   INTERVENTIONS PLANNED:  1. Family Education  2. Home Exercise Program (HEP)  3. Manual Therapy  4. Neuromuscular Re-education/Strengthening  5. Therapeutic Activites  6. Therapeutic Exercise/Strengthening     GOALS: (Goals have been discussed and agreed upon with patient.)  Short-Term Functional Goals: Time Frame: CONTINUE GOALS  1. Pt will demonstrate I with basic PFM HEP to improve awareness, coordination, and timing of PFM. 2. Pt will demonstrate understanding of and ability to teach back appropriate water intake, bladder irritants, toileting frequency, and positioning for improved self-management of symptoms.    3. Pt will demonstrate ability to perform diaphragmatic breathing and quick flick pelvic floor contractions in order to implement urge suppression and reduce episodes of UI.  4. Pt will demonstrate ability to perform a coordinate PFM and TA contraction during exhalation for improve coordination with functional tasks. Discharge Goals: Time Frame:   1. Pt will demonstrate ability to voluntarily contract the pelvic floor muscles prior to a cough or sneeze for decreased leakage during increases in intra-abdominal pressure. 2. Pt will demonstrate independence with an advanced HEP for general conditioning, core stabilization, and mobility to facilitate carry over and independent management of symptoms. Urinary: Frequency 12-14 x/day, 0 x/night. Positive for  urge urinary incontinence, urinary urgency, urinary frequency  Negative for  incomplete emptying. ,stress urinary incontinence, urinary hesitancy/intermittency, dysuria, hematuria, nocturia and nocturnal enuresis. Pt does not use pads for urinary protection; 0 pads per day (PPD). Fluid intake:  oz water/day; bladder irritants include: coffee in am, sometimes tea in afternoon. Pt does not limit fluid intake due to bladder control. Bowel: Frequency once a day. Positive for none. Negative for pain with bowel movement, pushing/straining with bowel movement, fecal incontinence, constipation and incomplete emptying. Pt does not use pads for bowel protection; 0 pads per day (PPD). Use of stool softeners or laxatives? NO  Sexual: Pt is sexually active with male partners. negative for dyspareunia. Contraception/birth control: yes. Pelvic Organ Prolapse/Pelvic Pain: Location: none. OB History: Number of pregnancies: 0 Number of vaginal deliveries: 0 Number of c-sections: 0. External Observations:  · Voluntary contraction: []? absent     [x]? present  · Involuntary contraction: []? absent     [x]? present  · Involuntary relaxation: []? absent     [x]? present  · Perineal descent at rest: []? absent     [x]? present  · Perineal descent with bearing: []? absent     [x]? present     Pelvic Floor Muscle (PFM) Assessment:  · Vaginal vault size: []? decreased     []? increased     [x]? WNL  · Muscle volume: []? decreased     [x]? WNL     []? Defect  · PFM tension: []? decreased     [x]? increased     []? WNL     Contraction ability:  Voluntary contraction: []? absent     [x]? weak     []? moderate      []? strong  Voluntary relaxation: []? absent     [x]? partial     []? complete   MMT: 2/5   Overflow: []? absent     []? min     []? mod     []? severe / Compensatory mm groups include      Tissue laxity test:  Anterior wall: [x]? minimum     []? moderate     []? severe      []? WNL  Posterior wall: [x]? minimum     []? moderate     []? severe      []? WNL    OUTCOME MEASURE:   Tool Used: Pelvic Floor Impact Questionnaireshort form 7 (PFIQ-7)   Score:  Initial:   · Bladder or Urine: 47/100  · Bowel or Rectum: 0/100  · Vagina or Pelvis: 0/100 Most Recent: X (Date: 10-21-21 )  · Bladder or Urine:47  · Bowel or Rectum: X  · Vagina or Pelvis: X   Interpretation of Score: Each of the 7 sections is scored on a scale from 0-3; 0 representing \"Not at all\", 3 representing \"Quite a bit\". The mean value is taken from all the answered items, then multiplied by 100 to obtain the scale score, ranging from 0-100. This process is repeated for each column representing bowel, bladder, and pelvic pain. Medical Necessity:   · Patient demonstrates GOOD rehab potential due to higher previous functional level. Reason for Services/Other Comments:  · Patient continues to require skilled intervention due to above mentioned deficits  . Total Duration:   PT Patient Time In/Time Out  Time In: 0830  Time Out: 0900    Rehabilitation Potential For Stated Goals: Good  Regarding Alee Maldonado's therapy, I certify that the treatment plan above will be carried out by a therapist or under their direction.   Thank you for this referral,  Joanie Vazquez DPT     Referring Physician Signature: Roberto Hicks,* _______________________________ Date _____________

## 2021-11-11 ENCOUNTER — HOSPITAL ENCOUNTER (OUTPATIENT)
Dept: PHYSICAL THERAPY | Age: 31
Discharge: HOME | End: 2021-11-11
Payer: COMMERCIAL

## 2021-11-11 PROCEDURE — 97140 MANUAL THERAPY 1/> REGIONS: CPT

## 2021-11-18 ENCOUNTER — APPOINTMENT (RX ONLY)
Dept: URBAN - METROPOLITAN AREA CLINIC 349 | Facility: CLINIC | Age: 31
Setting detail: DERMATOLOGY
End: 2021-11-18

## 2021-11-18 DIAGNOSIS — B07.8 OTHER VIRAL WARTS: ICD-10-CM

## 2021-11-18 DIAGNOSIS — L71.8 OTHER ROSACEA: ICD-10-CM

## 2021-11-18 PROCEDURE — 99213 OFFICE O/P EST LOW 20 MIN: CPT | Mod: 25

## 2021-11-18 PROCEDURE — 17110 DESTRUCTION B9 LES UP TO 14: CPT

## 2021-11-18 PROCEDURE — ? COUNSELING

## 2021-11-18 PROCEDURE — ? PRESCRIPTION MEDICATION MANAGEMENT

## 2021-11-18 PROCEDURE — ? BENIGN DESTRUCTION

## 2021-11-18 ASSESSMENT — LOCATION DETAILED DESCRIPTION DERM
LOCATION DETAILED: RIGHT THENAR EMINENCE
LOCATION DETAILED: LEFT INFERIOR CENTRAL MALAR CHEEK
LOCATION DETAILED: RIGHT ULNAR PALM
LOCATION DETAILED: RIGHT INFERIOR CENTRAL MALAR CHEEK
LOCATION DETAILED: RIGHT THENAR EMINENCE
LOCATION DETAILED: RIGHT ULNAR PALM

## 2021-11-18 ASSESSMENT — LOCATION SIMPLE DESCRIPTION DERM
LOCATION SIMPLE: RIGHT HAND
LOCATION SIMPLE: RIGHT CHEEK
LOCATION SIMPLE: LEFT CHEEK
LOCATION SIMPLE: RIGHT HAND

## 2021-11-18 ASSESSMENT — LOCATION ZONE DERM
LOCATION ZONE: HAND
LOCATION ZONE: FACE
LOCATION ZONE: HAND

## 2021-11-18 NOTE — PROCEDURE: BENIGN DESTRUCTION
Detail Level: Detailed
Render Note In Bullet Format When Appropriate: No
Treatment Number (Will Not Render If 0): 0
Consent: The patient's consent was obtained including but not limited to risks of crusting, scabbing, blistering, scarring, darker or lighter pigmentary change, recurrence, incomplete removal and infection.
Medical Necessity Clause: This procedure was medically necessary because the lesions that were treated were:
Medical Necessity Information: It is in your best interest to select a reason for this procedure from the list below. All of these items fulfill various CMS LCD requirements except the new and changing color options.
Post-Care Instructions: I reviewed with the patient in detail post-care instructions. Patient is to wear sunprotection, and avoid picking at any of the treated lesions. Pt may apply Vaseline to crusted or scabbing areas.
Include Z78.9 (Other Specified Conditions Influencing Health Status) As An Associated Diagnosis?: Yes

## 2021-12-01 ENCOUNTER — APPOINTMENT (RX ONLY)
Dept: URBAN - METROPOLITAN AREA CLINIC 349 | Facility: CLINIC | Age: 31
Setting detail: DERMATOLOGY
End: 2021-12-01

## 2021-12-01 DIAGNOSIS — B07.8 OTHER VIRAL WARTS: ICD-10-CM

## 2021-12-01 PROCEDURE — 17110 DESTRUCTION B9 LES UP TO 14: CPT

## 2021-12-01 PROCEDURE — ? COUNSELING

## 2021-12-01 PROCEDURE — ? BENIGN DESTRUCTION

## 2021-12-01 ASSESSMENT — LOCATION ZONE DERM
LOCATION ZONE: HAND
LOCATION ZONE: HAND

## 2021-12-01 ASSESSMENT — LOCATION DETAILED DESCRIPTION DERM
LOCATION DETAILED: RIGHT ULNAR PALM
LOCATION DETAILED: RIGHT THENAR EMINENCE
LOCATION DETAILED: RIGHT ULNAR PALM
LOCATION DETAILED: RIGHT THENAR EMINENCE

## 2021-12-01 ASSESSMENT — LOCATION SIMPLE DESCRIPTION DERM
LOCATION SIMPLE: RIGHT HAND
LOCATION SIMPLE: RIGHT HAND

## 2021-12-02 ENCOUNTER — HOSPITAL ENCOUNTER (OUTPATIENT)
Dept: PHYSICAL THERAPY | Age: 31
Discharge: HOME | End: 2021-12-02

## 2021-12-02 NOTE — PROGRESS NOTES
Tuyet Brewster  : 1990  Primary: [address] at [address]  [phone]   [phone]                  DATE: 2021    Patient called to cancel appointment today due to work meeting.      ARISTIDES BayT

## 2021-12-22 ENCOUNTER — APPOINTMENT (RX ONLY)
Dept: URBAN - METROPOLITAN AREA CLINIC 349 | Facility: CLINIC | Age: 31
Setting detail: DERMATOLOGY
End: 2021-12-22

## 2021-12-22 DIAGNOSIS — L71.8 OTHER ROSACEA: ICD-10-CM

## 2021-12-22 DIAGNOSIS — B07.8 OTHER VIRAL WARTS: ICD-10-CM

## 2021-12-22 PROCEDURE — ? FULL BODY SKIN EXAM - DECLINED

## 2021-12-22 PROCEDURE — ? COUNSELING

## 2021-12-22 PROCEDURE — ? PRESCRIPTION MEDICATION MANAGEMENT

## 2021-12-22 PROCEDURE — 17110 DESTRUCTION B9 LES UP TO 14: CPT

## 2021-12-22 PROCEDURE — ? BENIGN DESTRUCTION

## 2021-12-22 PROCEDURE — 99213 OFFICE O/P EST LOW 20 MIN: CPT | Mod: 25

## 2021-12-22 ASSESSMENT — LOCATION DETAILED DESCRIPTION DERM
LOCATION DETAILED: RIGHT THENAR EMINENCE
LOCATION DETAILED: RIGHT INFERIOR CENTRAL MALAR CHEEK
LOCATION DETAILED: RIGHT ULNAR PALM
LOCATION DETAILED: RIGHT ULNAR PALM
LOCATION DETAILED: LEFT INFERIOR CENTRAL MALAR CHEEK
LOCATION DETAILED: RIGHT THENAR EMINENCE

## 2021-12-22 ASSESSMENT — LOCATION ZONE DERM
LOCATION ZONE: FACE
LOCATION ZONE: HAND
LOCATION ZONE: HAND

## 2021-12-22 ASSESSMENT — LOCATION SIMPLE DESCRIPTION DERM
LOCATION SIMPLE: RIGHT HAND
LOCATION SIMPLE: RIGHT HAND
LOCATION SIMPLE: LEFT CHEEK
LOCATION SIMPLE: RIGHT CHEEK

## 2021-12-22 NOTE — PROCEDURE: BENIGN DESTRUCTION
Medical Necessity Information: It is in your best interest to select a reason for this procedure from the list below. All of these items fulfill various CMS LCD requirements except the new and changing color options.
Post-Care Instructions: I reviewed with the patient in detail post-care instructions. Patient is to wear sunprotection, and avoid picking at any of the treated lesions. Pt may apply Vaseline to crusted or scabbing areas.
Include Z78.9 (Other Specified Conditions Influencing Health Status) As An Associated Diagnosis?: Yes
Detail Level: Detailed
Render Note In Bullet Format When Appropriate: No
Treatment Number (Will Not Render If 0): 0
Consent: The patient's consent was obtained including but not limited to risks of crusting, scabbing, blistering, scarring, darker or lighter pigmentary change, recurrence, incomplete removal and infection.
Medical Necessity Clause: This procedure was medically necessary because the lesions that were treated were:

## 2022-01-19 ENCOUNTER — APPOINTMENT (RX ONLY)
Dept: URBAN - METROPOLITAN AREA CLINIC 349 | Facility: CLINIC | Age: 32
Setting detail: DERMATOLOGY
End: 2022-01-19

## 2022-01-19 DIAGNOSIS — L82.1 OTHER SEBORRHEIC KERATOSIS: ICD-10-CM

## 2022-01-19 DIAGNOSIS — B07.8 OTHER VIRAL WARTS: ICD-10-CM

## 2022-01-19 DIAGNOSIS — D22 MELANOCYTIC NEVI: ICD-10-CM

## 2022-01-19 DIAGNOSIS — K64.4 RESIDUAL HEMORRHOIDAL SKIN TAGS: ICD-10-CM

## 2022-01-19 PROBLEM — D22.9 MELANOCYTIC NEVI, UNSPECIFIED: Status: ACTIVE | Noted: 2022-01-19

## 2022-01-19 PROCEDURE — 17110 DESTRUCTION B9 LES UP TO 14: CPT

## 2022-01-19 PROCEDURE — ? TREATMENT REGIMEN

## 2022-01-19 PROCEDURE — 99213 OFFICE O/P EST LOW 20 MIN: CPT | Mod: 25

## 2022-01-19 PROCEDURE — ? COUNSELING

## 2022-01-19 PROCEDURE — ? BENIGN DESTRUCTION

## 2022-01-19 ASSESSMENT — LOCATION DETAILED DESCRIPTION DERM
LOCATION DETAILED: RIGHT ULNAR PALM
LOCATION DETAILED: RIGHT RADIAL PALM
LOCATION DETAILED: RIGHT ULNAR PALM
LOCATION DETAILED: RIGHT RADIAL PALM

## 2022-01-19 ASSESSMENT — LOCATION ZONE DERM
LOCATION ZONE: HAND
LOCATION ZONE: HAND

## 2022-01-19 ASSESSMENT — LOCATION SIMPLE DESCRIPTION DERM
LOCATION SIMPLE: RIGHT HAND
LOCATION SIMPLE: RIGHT HAND

## 2022-01-19 NOTE — PROCEDURE: BENIGN DESTRUCTION
Include Z78.9 (Other Specified Conditions Influencing Health Status) As An Associated Diagnosis?: Yes
Add 52 Modifier (Optional): no
Consent: The patient's consent was obtained including but not limited to risks of crusting, scabbing, blistering, scarring, darker or lighter pigmentary change, recurrence, incomplete removal and infection.
Post-Care Instructions: I reviewed with the patient in detail post-care instructions. Patient is to wear sunprotection, and avoid picking at any of the treated lesions. Pt may apply Vaseline to crusted or scabbing areas.
Detail Level: Detailed
Anesthesia Volume In Cc: 0
Medical Necessity Information: It is in your best interest to select a reason for this procedure from the list below. All of these items fulfill various CMS LCD requirements except the new and changing color options.
Medical Necessity Clause: This procedure was medically necessary because the lesions that were treated were:

## 2022-01-26 NOTE — THERAPY DISCHARGE
Meliza Dominguez  : 1990  Primary: [address] at [address]  [phone]   [phone]        OUTPATIENT PHYSICAL THERAPY:Discontinuation Summary 2021   ICD-10: Treatment Diagnosis: R27.8 Lack of coordination (muscle incoordination), R35.0 Frequency of micturition, N39.46 Mixed incontinence (Urge and stress incontinence), R39.15 Urgency of Urination Excludes1: urge incontinence (N39.41,N39.46)     Precautions/Allergies:   Patient has no allergy information on record. MEDICAL/REFERRING DIAGNOSIS:  Mixed incontinence [N39.46]   DATE OF ONSET:   REFERRING PHYSICIAN: Julee Mckinnon MD Orders: evaluate and treat  Return MD Appointment: Mayelin Garcia:  Meliza Dominguez has attended 6 out of 19 scheduled visits, with 8 cancellation(s) and 0 no shows. I am unable to formally assess progress toward goals at this time due to patient not returning to physical therapy. REASSESSMENT: Ms. Lazarus Cortes has been seen in skilled PT from 21 to 10-21-21. Patient has attended 10 out of 16 scheduled visits, with 5 cancellation(s) and 1 no shows. Treatment has emphasized manual therapy, down training, biofeedback, and coordination of PFM/core. Patient has made improvements in better control of core and pelvic floor, however continues to demonstrate deficits in relaxation of PFM contributing to urinary leakage and urgency. Patient will be working with a therapist on her OCD. Pt has progressed with goals as indicated below at this point and will continue to benefit from skilled PT in order to achieve remaining goals and maximize functional outcomes in order to return to [facility]. REASSESSMENT: Ms. Lazarus Cortes has been seen in skilled PT from 21 to 21. Patient has attended 7 out of 10 scheduled visits, with 2 cancellation(s) and 1 no shows.  Treatment has emphasized biofeedback, manual therapy, coordintion of PFM/core. Patient has made improvements in better control of core and pelvic floor, and has been more consistent with stress relief and yoga however continues to demonstrate deficits in overactivity of PFM contributing to urinary leakage and urgency. Pt has progressed with goals as indicated below at this point and will continue to benefit from skilled PT in order to achieve remaining goals and maximize functional outcomes in order to return to [facility]. INITIAL ASSESSMENT:  Ms. Briseno presents with pelvic floor muscle over activity,  lack of coordination, timing, awareness, endurance and strength. Patient is unable to isloate pelvic floor muscles from abdominals, contributing to over activity and incontinence. She also has a history of core and hip weakness from bulging discs in her low back resulting in further over activity of PFM. Patient would benefit from skilled physical therapy to address her deficits and maximize her function. GOALS: (Goals have been discussed and agreed upon with patient.)  Short-Term Functional Goals: Time Frame:  1. Pt will demonstrate I with basic PFM HEP to improve awareness, coordination, and timing of PFM. 2. Pt will demonstrate understanding of and ability to teach back appropriate water intake, bladder irritants, toileting frequency, and positioning for improved self-management of symptoms. 3. Pt will demonstrate ability to perform diaphragmatic breathing and quick flick pelvic floor contractions in order to implement urge suppression and reduce episodes of UI.  4. Pt will demonstrate ability to perform a coordinate PFM and TA contraction during exhalation for improve coordination with functional tasks. Discharge Goals: Time Frame:   1. Pt will demonstrate ability to voluntarily contract the pelvic floor muscles prior to a cough or sneeze for decreased leakage during increases in intra-abdominal pressure.    2. Pt will demonstrate independence with an advanced HEP for general conditioning, core stabilization, and mobility to facilitate carry over and independent management of symptoms. Urinary: Frequency 12-14 x/day, 0 x/night. Positive for  urge urinary incontinence, urinary urgency, urinary frequency  Negative for  incomplete emptying. ,stress urinary incontinence, urinary hesitancy/intermittency, dysuria, hematuria, nocturia and nocturnal enuresis. Pt does not use pads for urinary protection; 0 pads per day (PPD). Fluid intake:  oz water/day; bladder irritants include: coffee in am, sometimes tea in afternoon. Pt does not limit fluid intake due to bladder control. Bowel: Frequency once a day. Positive for none. Negative for pain with bowel movement, pushing/straining with bowel movement, fecal incontinence, constipation and incomplete emptying. Pt does not use pads for bowel protection; 0 pads per day (PPD). Use of stool softeners or laxatives? NO  Sexual: Pt is sexually active with male partners. negative for dyspareunia. Contraception/birth control: yes. Pelvic Organ Prolapse/Pelvic Pain: Location: none. OB History: Number of pregnancies: 0 Number of vaginal deliveries: 0 Number of c-sections: 0. External Observations:  · Voluntary contraction: []? absent     [x]? present  · Involuntary contraction: []? absent     [x]? present  · Involuntary relaxation: []? absent     [x]? present  · Perineal descent at rest: []? absent     [x]? present  · Perineal descent with bearing: []? absent     [x]? present     Pelvic Floor Muscle (PFM) Assessment:  · Vaginal vault size: []? decreased     []? increased     [x]? WNL  · Muscle volume: []? decreased     [x]? WNL     []? Defect  · PFM tension: []? decreased     [x]? increased     []? WNL     Contraction ability:  Voluntary contraction: []? absent     [x]? weak     []? moderate      []? strong  Voluntary relaxation: []? absent     [x]? partial     []?  complete   MMT: 2/5 Overflow: []? absent     []? min     []? mod     []? severe / Compensatory mm groups include      Tissue laxity test:  Anterior wall: [x]? minimum     []? moderate     []? severe      []? WNL  Posterior wall: [x]? minimum     []? moderate     []? severe      []? WNL    OUTCOME MEASURE:   Tool Used: Pelvic Floor Impact Questionnaire--short form 7 (PFIQ-7)   Score:  Initial:   · Bladder or Urine: 47/100  · Bowel or Rectum: 0/100  · Vagina or Pelvis: 0/100 Most Recent: X (Date: 10-21-21 )  · Bladder or Urine:47  · Bowel or Rectum: X  · Vagina or Pelvis: X   Interpretation of Score: Each of the 7 sections is scored on a scale from 0-3; 0 representing \"Not at all\", 3 representing \"Quite a bit\". The mean value is taken from all the answered items, then multiplied by 100 to obtain the scale score, ranging from 0-100. This process is repeated for each column representing bowel, bladder, and pelvic pain.     ARISTIDES LopezT

## 2022-02-17 ENCOUNTER — APPOINTMENT (RX ONLY)
Dept: URBAN - METROPOLITAN AREA CLINIC 329 | Facility: CLINIC | Age: 32
Setting detail: DERMATOLOGY
End: 2022-02-17

## 2022-02-17 DIAGNOSIS — D18.0 HEMANGIOMA: ICD-10-CM | Status: STABLE

## 2022-02-17 DIAGNOSIS — L72.0 EPIDERMAL CYST: ICD-10-CM

## 2022-02-17 DIAGNOSIS — L57.8 OTHER SKIN CHANGES DUE TO CHRONIC EXPOSURE TO NONIONIZING RADIATION: ICD-10-CM | Status: STABLE

## 2022-02-17 DIAGNOSIS — B07.8 OTHER VIRAL WARTS: ICD-10-CM | Status: INADEQUATELY CONTROLLED

## 2022-02-17 PROBLEM — D18.01 HEMANGIOMA OF SKIN AND SUBCUTANEOUS TISSUE: Status: ACTIVE | Noted: 2022-02-17

## 2022-02-17 PROCEDURE — ? ELECTRODESICCATION

## 2022-02-17 PROCEDURE — 17110 DESTRUCTION B9 LES UP TO 14: CPT

## 2022-02-17 PROCEDURE — ? FULL BODY SKIN EXAM - DECLINED

## 2022-02-17 PROCEDURE — ? COUNSELING

## 2022-02-17 PROCEDURE — 99213 OFFICE O/P EST LOW 20 MIN: CPT | Mod: 25

## 2022-02-17 ASSESSMENT — LOCATION SIMPLE DESCRIPTION DERM
LOCATION SIMPLE: LEFT HAND
LOCATION SIMPLE: RIGHT HAND
LOCATION SIMPLE: LEFT FOREHEAD
LOCATION SIMPLE: RIGHT UPPER BACK
LOCATION SIMPLE: CHEST
LOCATION SIMPLE: RIGHT HAND
LOCATION SIMPLE: RIGHT INDEX FINGER
LOCATION SIMPLE: ABDOMEN
LOCATION SIMPLE: ABDOMEN

## 2022-02-17 ASSESSMENT — LOCATION DETAILED DESCRIPTION DERM
LOCATION DETAILED: PERIUMBILICAL SKIN
LOCATION DETAILED: RIGHT PROXIMAL PALMAR INDEX FINGER
LOCATION DETAILED: RIGHT ULNAR PALM
LOCATION DETAILED: RIGHT RADIAL PALM
LOCATION DETAILED: LEFT ULNAR PALM
LOCATION DETAILED: PERIUMBILICAL SKIN
LOCATION DETAILED: RIGHT THENAR EMINENCE
LOCATION DETAILED: RIGHT SUPERIOR MEDIAL UPPER BACK
LOCATION DETAILED: LEFT MEDIAL SUPERIOR CHEST
LOCATION DETAILED: LEFT INFERIOR MEDIAL FOREHEAD

## 2022-02-17 ASSESSMENT — LOCATION ZONE DERM
LOCATION ZONE: HAND
LOCATION ZONE: TRUNK
LOCATION ZONE: FINGER
LOCATION ZONE: TRUNK
LOCATION ZONE: FACE
LOCATION ZONE: HAND

## 2022-02-17 NOTE — PROCEDURE: ELECTRODESICCATION
Include Z78.9 (Other Specified Conditions Influencing Health Status) As An Associated Diagnosis?: No
Medical Necessity Clause: This procedure was medically necessary because the lesions that were treated were:
Anesthesia Type: 1% lidocaine with epinephrine
Post-Care Instructions: I reviewed with the patient in detail post-care instructions. Patient is to wear sunprotection, and avoid picking at any of the treated lesions. Pt may apply Vaseline to crusted or scabbing areas
Consent: The patient's consent was obtained including but not limited to risks of crusting, scabbing, blistering, scarring, darker or lighter pigmentary change, recurrence, incomplete removal and infection.
Medical Necessity Information: It is in your best interest to select a reason for this procedure from the list below. All of these items fulfill various CMS LCD requirements except the new and changing color options.
Detail Level: Simple

## 2022-03-14 NOTE — HPI: SKIN LESION
How Severe Is Your Skin Lesion?: mild
Has Your Skin Lesion Been Treated?: not been treated
Is This A New Presentation, Or A Follow-Up?: Skin Lesion
Oral Minoxidil Pregnancy And Lactation Text: This medication should only be used when clearly needed if you are pregnant, attempting to become pregnant or breast feeding.

## 2022-03-17 ENCOUNTER — APPOINTMENT (RX ONLY)
Dept: URBAN - METROPOLITAN AREA CLINIC 329 | Facility: CLINIC | Age: 32
Setting detail: DERMATOLOGY
End: 2022-03-17

## 2022-03-17 DIAGNOSIS — I78.8 OTHER DISEASES OF CAPILLARIES: ICD-10-CM

## 2022-03-17 DIAGNOSIS — B07.8 OTHER VIRAL WARTS: ICD-10-CM

## 2022-03-17 PROCEDURE — ? ELECTRODESICCATION

## 2022-03-17 PROCEDURE — ? COUNSELING

## 2022-03-17 PROCEDURE — 17110 DESTRUCTION B9 LES UP TO 14: CPT

## 2022-03-17 PROCEDURE — 99212 OFFICE O/P EST SF 10 MIN: CPT | Mod: 25

## 2022-03-17 ASSESSMENT — LOCATION DETAILED DESCRIPTION DERM
LOCATION DETAILED: RIGHT ULNAR PALM
LOCATION DETAILED: RIGHT THENAR EMINENCE
LOCATION DETAILED: LEFT ULNAR PALM
LOCATION DETAILED: RIGHT MID DORSAL SMALL FINGER
LOCATION DETAILED: RIGHT RADIAL PALM
LOCATION DETAILED: RIGHT PROXIMAL PALMAR INDEX FINGER

## 2022-03-17 ASSESSMENT — LOCATION SIMPLE DESCRIPTION DERM
LOCATION SIMPLE: RIGHT HAND
LOCATION SIMPLE: RIGHT INDEX FINGER
LOCATION SIMPLE: RIGHT SMALL FINGER
LOCATION SIMPLE: RIGHT HAND
LOCATION SIMPLE: LEFT HAND

## 2022-03-17 ASSESSMENT — LOCATION ZONE DERM
LOCATION ZONE: HAND
LOCATION ZONE: HAND
LOCATION ZONE: FINGER

## 2022-04-14 ENCOUNTER — APPOINTMENT (RX ONLY)
Dept: URBAN - METROPOLITAN AREA CLINIC 329 | Facility: CLINIC | Age: 32
Setting detail: DERMATOLOGY
End: 2022-04-14

## 2022-04-14 DIAGNOSIS — L57.8 OTHER SKIN CHANGES DUE TO CHRONIC EXPOSURE TO NONIONIZING RADIATION: ICD-10-CM

## 2022-04-14 DIAGNOSIS — D18.0 HEMANGIOMA: ICD-10-CM

## 2022-04-14 DIAGNOSIS — L90.5 SCAR CONDITIONS AND FIBROSIS OF SKIN: ICD-10-CM

## 2022-04-14 DIAGNOSIS — L71.8 OTHER ROSACEA: ICD-10-CM | Status: STABLE

## 2022-04-14 DIAGNOSIS — L82.0 INFLAMED SEBORRHEIC KERATOSIS: ICD-10-CM

## 2022-04-14 PROBLEM — D18.01 HEMANGIOMA OF SKIN AND SUBCUTANEOUS TISSUE: Status: ACTIVE | Noted: 2022-04-14

## 2022-04-14 PROCEDURE — ? ADDITIONAL NOTES

## 2022-04-14 PROCEDURE — 99214 OFFICE O/P EST MOD 30 MIN: CPT | Mod: 25

## 2022-04-14 PROCEDURE — ? FULL BODY SKIN EXAM - DECLINED

## 2022-04-14 PROCEDURE — 17110 DESTRUCTION B9 LES UP TO 14: CPT

## 2022-04-14 PROCEDURE — ? COUNSELING

## 2022-04-14 PROCEDURE — ? BENIGN DESTRUCTION

## 2022-04-14 PROCEDURE — ? PRESCRIPTION MEDICATION MANAGEMENT

## 2022-04-14 ASSESSMENT — LOCATION SIMPLE DESCRIPTION DERM
LOCATION SIMPLE: CHEST
LOCATION SIMPLE: RIGHT FOREHEAD
LOCATION SIMPLE: RIGHT HAND
LOCATION SIMPLE: RIGHT UPPER BACK
LOCATION SIMPLE: RIGHT HAND
LOCATION SIMPLE: RIGHT FOREHEAD
LOCATION SIMPLE: ABDOMEN
LOCATION SIMPLE: ABDOMEN
LOCATION SIMPLE: RIGHT SUPERIOR EYELID

## 2022-04-14 ASSESSMENT — LOCATION DETAILED DESCRIPTION DERM
LOCATION DETAILED: PERIUMBILICAL SKIN
LOCATION DETAILED: PERIUMBILICAL SKIN
LOCATION DETAILED: RIGHT RADIAL DORSAL HAND
LOCATION DETAILED: RIGHT INFERIOR FOREHEAD
LOCATION DETAILED: RIGHT INFERIOR FOREHEAD
LOCATION DETAILED: RIGHT SUPERIOR MEDIAL UPPER BACK
LOCATION DETAILED: LEFT MEDIAL SUPERIOR CHEST
LOCATION DETAILED: RIGHT MEDIAL SUPERIOR EYELID
LOCATION DETAILED: RIGHT RADIAL DORSAL HAND

## 2022-04-14 ASSESSMENT — LOCATION ZONE DERM
LOCATION ZONE: TRUNK
LOCATION ZONE: FACE
LOCATION ZONE: HAND
LOCATION ZONE: HAND
LOCATION ZONE: FACE
LOCATION ZONE: TRUNK
LOCATION ZONE: EYELID

## 2022-04-14 NOTE — PROCEDURE: BENIGN DESTRUCTION
Render Post-Care Instructions In Note?: no
Anesthesia Volume In Cc: 0.5
Post-Care Instructions: I reviewed with the patient in detail post-care instructions. Patient is to wear sunprotection, and avoid picking at any of the treated lesions. Pt may apply Vaseline to crusted or scabbing areas.
Medical Necessity Information: It is in your best interest to select a reason for this procedure from the list below. All of these items fulfill various CMS LCD requirements except the new and changing color options.
Treatment Number (Will Not Render If 0): 0
Detail Level: Detailed
Consent: The patient's consent was obtained including but not limited to risks of crusting, scabbing, blistering, scarring, darker or lighter pigmentary change, recurrence, incomplete removal and infection.
Medical Necessity Clause: This procedure was medically necessary because the lesions that were treated were:

## 2022-05-24 ENCOUNTER — APPOINTMENT (RX ONLY)
Dept: URBAN - METROPOLITAN AREA CLINIC 329 | Facility: CLINIC | Age: 32
Setting detail: DERMATOLOGY
End: 2022-05-24

## 2022-05-24 DIAGNOSIS — B07.8 OTHER VIRAL WARTS: ICD-10-CM | Status: RESOLVING

## 2022-05-24 DIAGNOSIS — L72.8 OTHER FOLLICULAR CYSTS OF THE SKIN AND SUBCUTANEOUS TISSUE: ICD-10-CM

## 2022-05-24 DIAGNOSIS — L73.8 OTHER SPECIFIED FOLLICULAR DISORDERS: ICD-10-CM

## 2022-05-24 DIAGNOSIS — D22 MELANOCYTIC NEVI: ICD-10-CM

## 2022-05-24 PROBLEM — D22.5 MELANOCYTIC NEVI OF TRUNK: Status: ACTIVE | Noted: 2022-05-24

## 2022-05-24 PROCEDURE — 99213 OFFICE O/P EST LOW 20 MIN: CPT

## 2022-05-24 PROCEDURE — ? COUNSELING

## 2022-05-24 ASSESSMENT — LOCATION DETAILED DESCRIPTION DERM
LOCATION DETAILED: RIGHT VENTRAL PROXIMAL FOREARM
LOCATION DETAILED: LEFT RADIAL PALM
LOCATION DETAILED: RIGHT RADIAL PALM
LOCATION DETAILED: RIGHT CENTRAL EYEBROW
LOCATION DETAILED: STERNAL NOTCH

## 2022-05-24 ASSESSMENT — LOCATION ZONE DERM
LOCATION ZONE: TRUNK
LOCATION ZONE: FACE
LOCATION ZONE: HAND
LOCATION ZONE: ARM

## 2022-05-24 ASSESSMENT — LOCATION SIMPLE DESCRIPTION DERM
LOCATION SIMPLE: RIGHT HAND
LOCATION SIMPLE: CHEST
LOCATION SIMPLE: LEFT HAND
LOCATION SIMPLE: RIGHT EYEBROW
LOCATION SIMPLE: RIGHT FOREARM

## 2022-06-14 ENCOUNTER — APPOINTMENT (RX ONLY)
Dept: URBAN - METROPOLITAN AREA CLINIC 329 | Facility: CLINIC | Age: 32
Setting detail: DERMATOLOGY
End: 2022-06-14

## 2022-06-14 DIAGNOSIS — L73.8 OTHER SPECIFIED FOLLICULAR DISORDERS: ICD-10-CM

## 2022-06-14 DIAGNOSIS — B07.0 PLANTAR WART: ICD-10-CM

## 2022-06-14 PROCEDURE — ? BENIGN DESTRUCTION

## 2022-06-14 PROCEDURE — 99212 OFFICE O/P EST SF 10 MIN: CPT | Mod: 25

## 2022-06-14 PROCEDURE — ? OTHER

## 2022-06-14 PROCEDURE — ? COUNSELING

## 2022-06-14 PROCEDURE — 17110 DESTRUCTION B9 LES UP TO 14: CPT

## 2022-06-14 PROCEDURE — ? ADDITIONAL NOTES

## 2022-06-14 PROCEDURE — ? FULL BODY SKIN EXAM - DECLINED

## 2022-06-14 ASSESSMENT — LOCATION ZONE DERM
LOCATION ZONE: TOE
LOCATION ZONE: FACE

## 2022-06-14 ASSESSMENT — LOCATION DETAILED DESCRIPTION DERM
LOCATION DETAILED: RIGHT DORSAL GREAT TOE
LOCATION DETAILED: RIGHT CENTRAL EYEBROW

## 2022-06-14 ASSESSMENT — LOCATION SIMPLE DESCRIPTION DERM
LOCATION SIMPLE: RIGHT EYEBROW
LOCATION SIMPLE: RIGHT GREAT TOE

## 2022-06-14 NOTE — PROCEDURE: FULL BODY SKIN EXAM - DECLINED
Problem: Falls - Risk of  Goal: *Absence of Falls  Document Alex Fall Risk and appropriate interventions in the flowsheet.   Outcome: Progressing Towards Goal  Fall Risk Interventions:            Medication Interventions: Evaluate medications/consider consulting pharmacy, Teach patient to arise slowly    Elimination Interventions: Call light in reach
Detail Level: Simple
Price (Do Not Change): 0.00
Instructions: This plan will send the code FBSD to the PM system.  DO NOT or CHANGE the price.

## 2022-06-14 NOTE — HPI: SKIN LESION
How Severe Is Your Skin Lesion?: mild
Is This A New Presentation, Or A Follow-Up?: Skin Lesion
Additional History: Patient stated she thinks she might have a wart on her right foot. She stated lesions has been present for about month.  Patient also expressed concerns about the lesion on her right eyebrow that was looked at at previous visit. Patient stated lesion has gotten larger.

## 2022-06-14 NOTE — PROCEDURE: OTHER
Other (Free Text): Patient stated that she had been out hiking and stated that when lesion first appeared, it was a small red bump. Patient thought it could of been a bug bite. \\n\\nUpon examination today, lesion is very small and appears more like a wart rather than a bug bite. Reassured patient that if it is a wart, the treatment could take care of it. Patient expressed understanding.
Render Risk Assessment In Note?: no
Note Text (......Xxx Chief Complaint.): This diagnosis correlates with the
Detail Level: Zone

## 2022-06-14 NOTE — PROCEDURE: REASSURANCE
Hide Additional Notes?: No
Detail Level: Detailed
Additional Notes (Optional): Reassured patient that lesion can get larger.

## 2022-06-14 NOTE — PROCEDURE: BENIGN DESTRUCTION
Render Post-Care Instructions In Note?: no
Medical Necessity Clause: This procedure was medically necessary because the lesions that were treated were:
Consent: The patient's consent was obtained including but not limited to risks of crusting, scabbing, blistering, scarring, darker or lighter pigmentary change, recurrence, incomplete removal and infection.
Medical Necessity Information: It is in your best interest to select a reason for this procedure from the list below. All of these items fulfill various CMS LCD requirements except the new and changing color options.
Detail Level: Detailed
Treatment Number (Will Not Render If 0): 0
Post-Care Instructions: I reviewed with the patient in detail post-care instructions. Patient is to wear sunprotection, and avoid picking at any of the treated lesions. Pt may apply Vaseline to crusted or scabbing areas.
Anesthesia Volume In Cc: 0.5

## 2022-06-28 ENCOUNTER — APPOINTMENT (RX ONLY)
Dept: URBAN - METROPOLITAN AREA CLINIC 329 | Facility: CLINIC | Age: 32
Setting detail: DERMATOLOGY
End: 2022-06-28

## 2022-06-28 DIAGNOSIS — L82.1 OTHER SEBORRHEIC KERATOSIS: ICD-10-CM

## 2022-06-28 DIAGNOSIS — L82.0 INFLAMED SEBORRHEIC KERATOSIS: ICD-10-CM

## 2022-06-28 DIAGNOSIS — L73.8 OTHER SPECIFIED FOLLICULAR DISORDERS: ICD-10-CM

## 2022-06-28 DIAGNOSIS — L71.8 OTHER ROSACEA: ICD-10-CM | Status: WELL CONTROLLED

## 2022-06-28 PROCEDURE — ? BENIGN DESTRUCTION

## 2022-06-28 PROCEDURE — 99214 OFFICE O/P EST MOD 30 MIN: CPT | Mod: 25

## 2022-06-28 PROCEDURE — ? COUNSELING

## 2022-06-28 PROCEDURE — ? FULL BODY SKIN EXAM - DECLINED

## 2022-06-28 PROCEDURE — 17110 DESTRUCTION B9 LES UP TO 14: CPT

## 2022-06-28 PROCEDURE — ? PRESCRIPTION MEDICATION MANAGEMENT

## 2022-06-28 ASSESSMENT — LOCATION SIMPLE DESCRIPTION DERM
LOCATION SIMPLE: RIGHT FOREHEAD
LOCATION SIMPLE: LEFT ANTERIOR NECK
LOCATION SIMPLE: RIGHT FOREHEAD
LOCATION SIMPLE: LEFT ANTERIOR NECK
LOCATION SIMPLE: LEFT FOREARM
LOCATION SIMPLE: RIGHT EYEBROW

## 2022-06-28 ASSESSMENT — LOCATION ZONE DERM
LOCATION ZONE: ARM
LOCATION ZONE: NECK
LOCATION ZONE: FACE
LOCATION ZONE: FACE
LOCATION ZONE: NECK

## 2022-06-28 ASSESSMENT — LOCATION DETAILED DESCRIPTION DERM
LOCATION DETAILED: LEFT INFERIOR ANTERIOR NECK
LOCATION DETAILED: RIGHT FOREHEAD
LOCATION DETAILED: LEFT INFERIOR ANTERIOR NECK
LOCATION DETAILED: RIGHT CENTRAL EYEBROW
LOCATION DETAILED: LEFT PROXIMAL DORSAL FOREARM
LOCATION DETAILED: RIGHT FOREHEAD

## 2022-06-28 NOTE — PROCEDURE: BENIGN DESTRUCTION
Medical Necessity Clause: This procedure was medically necessary because the lesions that were treated were:
Consent: The patient's consent was obtained including but not limited to risks of crusting, scabbing, blistering, scarring, darker or lighter pigmentary change, recurrence, incomplete removal and infection.
Treatment Number (Will Not Render If 0): 0
Render Post-Care Instructions In Note?: no
Medical Necessity Information: It is in your best interest to select a reason for this procedure from the list below. All of these items fulfill various CMS LCD requirements except the new and changing color options.
Post-Care Instructions: I reviewed with the patient in detail post-care instructions. Patient is to wear sunprotection, and avoid picking at any of the treated lesions. Pt may apply Vaseline to crusted or scabbing areas.
Detail Level: Detailed
Anesthesia Volume In Cc: 0.5

## 2022-07-05 NOTE — PROGRESS NOTES
Karla Ernie  : 1990  Primary: [address]  Secondary:  [address]  at [facility]  [address] , [address], [place] [address]  [phone]   [phone]      OUTPATIENT PHYSICAL THERAPY: Daily Treatment Note 2020  ICD-10: Treatment Diagnosis: N39.46 Mixed incontinence (Urge and stress incontinence), R27.8 Lack of coordination (muscle incoordination)   Precautions/Allergies:   Patient has no allergy information on record. TREATMENT PLAN:  Effective Dates: 2020 TO 2020 (90 days). Frequency/Duration: 1 time a week for 90 Day(s) MEDICAL/REFERRING DIAGNOSIS:  Mixed incontinence [N39.46]   DATE OF ONSET:   REFERRING PHYSICIAN: Nataly Boykin MD MD Orders: evaluate and treat  Return MD Appointment: -     Pre-treatment Symptoms/Complaints:  Patient reports things are better. Not having as many urgency issues. She is able to control urine better. She is correlating stress with leakage. Pain: Initial:   0 Post Session:  0/10   Medications Last Reviewed:  2020   Updated Objective Findings:  see below   Ms. Bea Sanchez is a 26 yo female referred to pelvic floor physical therapy (PFPT) by Nataly Boykin MD  mixed incontinence. Pt reports that symptoms began 2019. She doesn't know how it started. Reports she can do physical activity and its fine its when she has to jump or jump rope. Happens with sneezing not coughing. If she suddenly gets the urge to go it will happen on the way to the bathroom. The urge hits pretty hard. Leakage is more pronounced in the afternoon. Has a lot more stress in her life right now with a career change.      Urinary: Frequency 9-12 x/day, 0 x/night. Positive for stress urinary incontinence, urge urinary incontinence, urinary urgency and urinary frequency. Negative for incomplete emptying, urinary hesitancy/intermittency, dysuria, hematuria and nocturia.  Pt does not use pads for protection; 0 pads per Patient used call light to ask for pain medicine. Pt daughter yelling at staff stating 'My mother needs something for pain, we have been here since 2'. Dr. Shaka Hanks notified. MD stated he has already had a conversation with patient and her daughter about pain medicine and that opioid medication will only make symptoms worse. Pt assisted to bathroom, given cup to give urine sample. day (PPD). Fluid intake: 1 gallon water/day; bladder irritants include: herbal/green tea in am  Bowel: Frequency 2-3 times a day. Positive for none. Negative for pain with bowel movement, pushing/straining with bowel movement, fecal incontinence, constipation and incomplete emptying. Sexual: Pt is sexually active with male partners. Contraception/birth control: sprintex. negative for dyspareunia. Pelvic Organ Prolapse/Pelvic Pain: Location: none. OB History: none    External Observation:   · Voluntary Contraction: present  · Voluntary Relaxation: present, dealyed  · Vaginal Vault Size: within normal limits     PALPATION:  increased tone throughout PFM, no pain     RANGE OF MOTION:  Decreased due to tone     STRENGTH:  P: Power, E: Endurance, R: Repetitions, QF: Quick Flicks, TrA: Transverse Abdominus  P 2   E 1   R 3   QF     TrA        COORDINATION:  PFM Coordination: decreased  Diaphragmatic breathing (DB): able to perform with cues     TREATMENT:   THERAPEUTIC EXERCISE: (15 minutes):  Exercises per grid below to improve mobility, strength and coordination. Required minimal visual, verbal and manual cues to promote proper body alignment, promote proper body posture and promote proper body mechanics. Progressed resistance, range and repetitions as indicated. All exercises performed with emphasis on kegel and breathing in order improve coordination, awareness and to minimize symptoms. Date:  6-4-20 Date:  6-30-20 Date:     Activity/Exercise Parameters Parameters Parameters   Patient Education Discussed HEP and POC     breathing reviewed       drops reviewed       SKTC        Figure 4        butterfly        bridge  x20      clamshell  x20      Quadruped  x20      Sit to stand    x20             Pt gives verbal consent to internal  assessment/treatment no chaperon present.      NEURO REEDUCATION: ( minutes) to improve control and coordination of pelvic floor     Date:  6-22-20 Date:   Date: Activity/Exercise Parameters Parameters Parameters   Biofeedback With sEMG was utilized for coordination of PFM. Supine, Sitting, Standing                                           MANUAL THERAPY: (30 minutes) to improve soft tissue tone and mobility  Date Type Location Comments   6/30/2020 Internal assessment/treatment Via vaginal canal SP, CTM                                         (Used abbreviations: MET - muscle energy technique; SCS- Strain counter strain; CTM-Connective tissue mobilizations; CR- Contract/relax; SP- Sustained pressure, TrP-Trigger point release, IASTM- Instrument assisted soft tissue mobilizations, TDN-Trigger point dry needling)    THERAPEUTIC ACTIVITY: (15 minutes)   -discussed, reviewed, performed urge suppression      [place]if[place]     Treatment/Session Summary:  Pt reports good understanding of plan of care, as well as prescribed home exercise program.  All questions were answered to pt's satisfaction. Pt was invited to call with any further questions or concerns. · Response to Treatment:  Patient demonstrates less tightness of pelvic floor today. Updated HEP for strength and urge suppression. · Communication/Consultation:  None today  · Equipment provided today:  None today  · Recommendations/Intent for next treatment session: Next visit will focus on biofeedback, manual therapy, stretches, urge suppression.   Total Treatment Billable Duration:  30 minutes manual, 15 minutes there ex, 10 minutes there act  PT Patient Time In/Time Out  Time In: 1300  Time Out: [address], CURTIS    Future Appointments   Date Time Provider Everton Fishman   7/7/2020  2:00 PM CURTIS Herron   7/14/2020  1:00 PM CURTIS Herron [facility]

## 2022-08-15 ENCOUNTER — APPOINTMENT (RX ONLY)
Dept: URBAN - METROPOLITAN AREA CLINIC 329 | Facility: CLINIC | Age: 32
Setting detail: DERMATOLOGY
End: 2022-08-15

## 2022-08-15 DIAGNOSIS — L81.4 OTHER MELANIN HYPERPIGMENTATION: ICD-10-CM

## 2022-08-15 DIAGNOSIS — D22 MELANOCYTIC NEVI: ICD-10-CM

## 2022-08-15 DIAGNOSIS — L71.8 OTHER ROSACEA: ICD-10-CM | Status: WELL CONTROLLED

## 2022-08-15 DIAGNOSIS — D18.0 HEMANGIOMA: ICD-10-CM

## 2022-08-15 PROBLEM — D22.61 MELANOCYTIC NEVI OF RIGHT UPPER LIMB, INCLUDING SHOULDER: Status: ACTIVE | Noted: 2022-08-15

## 2022-08-15 PROBLEM — D22.72 MELANOCYTIC NEVI OF LEFT LOWER LIMB, INCLUDING HIP: Status: ACTIVE | Noted: 2022-08-15

## 2022-08-15 PROBLEM — D22.5 MELANOCYTIC NEVI OF TRUNK: Status: ACTIVE | Noted: 2022-08-15

## 2022-08-15 PROBLEM — D18.01 HEMANGIOMA OF SKIN AND SUBCUTANEOUS TISSUE: Status: ACTIVE | Noted: 2022-08-15

## 2022-08-15 PROBLEM — D22.71 MELANOCYTIC NEVI OF RIGHT LOWER LIMB, INCLUDING HIP: Status: ACTIVE | Noted: 2022-08-15

## 2022-08-15 PROCEDURE — ? DERMATOSCOPIC EVALUATION

## 2022-08-15 PROCEDURE — ? COUNSELING

## 2022-08-15 PROCEDURE — ? ADDITIONAL NOTES

## 2022-08-15 PROCEDURE — ? SUNSCREEN RECOMMENDATIONS

## 2022-08-15 PROCEDURE — ? PRESCRIPTION MEDICATION MANAGEMENT

## 2022-08-15 PROCEDURE — ? FULL BODY SKIN EXAM

## 2022-08-15 PROCEDURE — ? PRESCRIPTION

## 2022-08-15 PROCEDURE — 99213 OFFICE O/P EST LOW 20 MIN: CPT

## 2022-08-15 RX ORDER — MINOCYCLINE 15 MG/G
AEROSOL, FOAM TOPICAL
Qty: 30 | Refills: 11 | Status: ERX | COMMUNITY
Start: 2022-08-15

## 2022-08-15 RX ORDER — IVERMECTIN 10 MG/G
CREAM TOPICAL
Qty: 45 | Refills: 11 | Status: ERX | COMMUNITY
Start: 2022-08-15

## 2022-08-15 RX ADMIN — IVERMECTIN: 10 CREAM TOPICAL at 00:00

## 2022-08-15 RX ADMIN — MINOCYCLINE: 15 AEROSOL, FOAM TOPICAL at 00:00

## 2022-08-15 ASSESSMENT — LOCATION SIMPLE DESCRIPTION DERM
LOCATION SIMPLE: LEFT UPPER BACK
LOCATION SIMPLE: ABDOMEN
LOCATION SIMPLE: UPPER BACK
LOCATION SIMPLE: RIGHT POPLITEAL SKIN
LOCATION SIMPLE: LEFT LOWER BACK
LOCATION SIMPLE: LEFT THIGH
LOCATION SIMPLE: CHEST
LOCATION SIMPLE: RIGHT FOREARM
LOCATION SIMPLE: LEFT CALF
LOCATION SIMPLE: LEFT FOREARM
LOCATION SIMPLE: RIGHT PRETIBIAL REGION
LOCATION SIMPLE: LEFT POSTERIOR THIGH

## 2022-08-15 ASSESSMENT — LOCATION ZONE DERM
LOCATION ZONE: TRUNK
LOCATION ZONE: ARM
LOCATION ZONE: LEG

## 2022-08-15 ASSESSMENT — LOCATION DETAILED DESCRIPTION DERM
LOCATION DETAILED: LEFT DISTAL POSTERIOR THIGH
LOCATION DETAILED: UPPER STERNUM
LOCATION DETAILED: EPIGASTRIC SKIN
LOCATION DETAILED: LEFT ANTERIOR DISTAL THIGH
LOCATION DETAILED: LEFT PROXIMAL DORSAL FOREARM
LOCATION DETAILED: LEFT DISTAL CALF
LOCATION DETAILED: LEFT INFERIOR MEDIAL MIDBACK
LOCATION DETAILED: INFERIOR THORACIC SPINE
LOCATION DETAILED: LEFT INFERIOR LATERAL MIDBACK
LOCATION DETAILED: LEFT SUPERIOR UPPER BACK
LOCATION DETAILED: RIGHT POPLITEAL SKIN
LOCATION DETAILED: RIGHT PROXIMAL PRETIBIAL REGION
LOCATION DETAILED: RIGHT VENTRAL PROXIMAL FOREARM

## 2022-08-15 ASSESSMENT — SEVERITY ASSESSMENT OVERALL AMONG ALL PATIENTS: IN YOUR EXPERIENCE, AMONG ALL PATIENTS YOU HAVE SEEN WITH THIS CONDITION, HOW SEVERE IS THIS PATIENT'S CONDITION?: CLEAR

## 2022-10-12 ENCOUNTER — RX ONLY (OUTPATIENT)
Age: 32
Setting detail: RX ONLY
End: 2022-10-12

## 2022-10-12 RX ORDER — IVERMECTIN 10 MG/G
CREAM TOPICAL
Qty: 45 | Refills: 11 | Status: ERX

## 2023-02-10 ENCOUNTER — APPOINTMENT (RX ONLY)
Dept: URBAN - METROPOLITAN AREA CLINIC 329 | Facility: CLINIC | Age: 33
Setting detail: DERMATOLOGY
End: 2023-02-10

## 2023-02-10 DIAGNOSIS — L71.8 OTHER ROSACEA: ICD-10-CM | Status: WELL CONTROLLED

## 2023-02-10 PROBLEM — D48.5 NEOPLASM OF UNCERTAIN BEHAVIOR OF SKIN: Status: ACTIVE | Noted: 2023-02-10

## 2023-02-10 PROCEDURE — ? BIOPSY BY SHAVE METHOD

## 2023-02-10 PROCEDURE — 99213 OFFICE O/P EST LOW 20 MIN: CPT | Mod: 25

## 2023-02-10 PROCEDURE — ? PRESCRIPTION MEDICATION MANAGEMENT

## 2023-02-10 PROCEDURE — 11102 TANGNTL BX SKIN SINGLE LES: CPT

## 2023-02-10 PROCEDURE — ? COUNSELING

## 2023-02-10 ASSESSMENT — LOCATION DETAILED DESCRIPTION DERM
LOCATION DETAILED: LEFT INFERIOR CENTRAL MALAR CHEEK
LOCATION DETAILED: RIGHT CENTRAL MALAR CHEEK

## 2023-02-10 ASSESSMENT — LOCATION ZONE DERM: LOCATION ZONE: FACE

## 2023-02-10 ASSESSMENT — SEVERITY ASSESSMENT OVERALL AMONG ALL PATIENTS: IN YOUR EXPERIENCE, AMONG ALL PATIENTS YOU HAVE SEEN WITH THIS CONDITION, HOW SEVERE IS THIS PATIENT'S CONDITION?: CLEAR

## 2023-02-10 ASSESSMENT — LOCATION SIMPLE DESCRIPTION DERM
LOCATION SIMPLE: RIGHT CHEEK
LOCATION SIMPLE: LEFT CHEEK

## 2023-03-23 ENCOUNTER — APPOINTMENT (RX ONLY)
Dept: URBAN - METROPOLITAN AREA CLINIC 329 | Facility: CLINIC | Age: 33
Setting detail: DERMATOLOGY
End: 2023-03-23

## 2023-03-23 DIAGNOSIS — L72.0 EPIDERMAL CYST: ICD-10-CM | Status: INADEQUATELY CONTROLLED

## 2023-03-23 DIAGNOSIS — L82.1 OTHER SEBORRHEIC KERATOSIS: ICD-10-CM | Status: INADEQUATELY CONTROLLED

## 2023-03-23 DIAGNOSIS — D22 MELANOCYTIC NEVI: ICD-10-CM | Status: INADEQUATELY CONTROLLED

## 2023-03-23 DIAGNOSIS — Z71.89 OTHER SPECIFIED COUNSELING: ICD-10-CM

## 2023-03-23 PROBLEM — D22.72 MELANOCYTIC NEVI OF LEFT LOWER LIMB, INCLUDING HIP: Status: ACTIVE | Noted: 2023-03-23

## 2023-03-23 PROBLEM — D22.5 MELANOCYTIC NEVI OF TRUNK: Status: ACTIVE | Noted: 2023-03-23

## 2023-03-23 PROBLEM — D22.62 MELANOCYTIC NEVI OF LEFT UPPER LIMB, INCLUDING SHOULDER: Status: ACTIVE | Noted: 2023-03-23

## 2023-03-23 PROCEDURE — ? ELECTRODESICCATION

## 2023-03-23 PROCEDURE — 17111 DESTRUCTION B9 LESIONS 15/>: CPT

## 2023-03-23 PROCEDURE — ? COUNSELING

## 2023-03-23 PROCEDURE — ? SUNSCREEN RECOMMENDATIONS

## 2023-03-23 PROCEDURE — 99213 OFFICE O/P EST LOW 20 MIN: CPT | Mod: 25

## 2023-03-23 ASSESSMENT — LOCATION DETAILED DESCRIPTION DERM
LOCATION DETAILED: RIGHT INFERIOR MEDIAL FOREHEAD
LOCATION DETAILED: LEFT SUPERIOR FOREHEAD
LOCATION DETAILED: LEFT RIB CAGE
LOCATION DETAILED: RIGHT LATERAL FOREHEAD
LOCATION DETAILED: RIGHT INFERIOR CENTRAL MALAR CHEEK
LOCATION DETAILED: RIGHT SUPERIOR FOREHEAD
LOCATION DETAILED: LEFT ANTERIOR DISTAL UPPER ARM
LOCATION DETAILED: LEFT FOREHEAD
LOCATION DETAILED: RIGHT MEDIAL SUPERIOR CHEST
LOCATION DETAILED: LEFT INFERIOR LATERAL FOREHEAD
LOCATION DETAILED: LEFT LATERAL EYEBROW
LOCATION DETAILED: RIGHT FOREHEAD
LOCATION DETAILED: RIGHT UPPER CUTANEOUS LIP
LOCATION DETAILED: LEFT ANTERIOR DISTAL THIGH
LOCATION DETAILED: LEFT INFERIOR MEDIAL MALAR CHEEK
LOCATION DETAILED: RIGHT SUPERIOR MEDIAL FOREHEAD
LOCATION DETAILED: LEFT LATERAL ABDOMEN
LOCATION DETAILED: LEFT INFERIOR FOREHEAD

## 2023-03-23 ASSESSMENT — LOCATION SIMPLE DESCRIPTION DERM
LOCATION SIMPLE: LEFT EYEBROW
LOCATION SIMPLE: RIGHT LIP
LOCATION SIMPLE: LEFT FOREHEAD
LOCATION SIMPLE: LEFT CHEEK
LOCATION SIMPLE: CHEST
LOCATION SIMPLE: LEFT THIGH
LOCATION SIMPLE: ABDOMEN
LOCATION SIMPLE: RIGHT CHEEK
LOCATION SIMPLE: RIGHT FOREHEAD
LOCATION SIMPLE: LEFT UPPER ARM

## 2023-03-23 ASSESSMENT — LOCATION ZONE DERM
LOCATION ZONE: LIP
LOCATION ZONE: LEG
LOCATION ZONE: FACE
LOCATION ZONE: ARM
LOCATION ZONE: TRUNK

## 2023-03-23 NOTE — PROCEDURE: ELECTRODESICCATION
Include Z78.9 (Other Specified Conditions Influencing Health Status) As An Associated Diagnosis?: No
Anesthesia Type: 1% lidocaine with epinephrine
Detail Level: Simple
Post-Care Instructions: I reviewed with the patient in detail post-care instructions. Patient is to wear sunprotection, and avoid picking at any of the treated lesions. Pt may apply Vaseline to crusted or scabbing areas
Medical Necessity Clause: This procedure was medically necessary because the lesions that were treated were:
Medical Necessity Information: It is in your best interest to select a reason for this procedure from the list below. All of these items fulfill various CMS LCD requirements except the new and changing color options.
Consent: The patient's consent was obtained including but not limited to risks of crusting, scabbing, blistering, scarring, darker or lighter pigmentary change, recurrence, incomplete removal and infection.

## 2023-03-23 NOTE — HPI: SKIN LESION
How Severe Is Your Skin Lesion?: mild
Is This A New Presentation, Or A Follow-Up?: Skin Lesions
Additional History: Patient reports having a few areas on concerns. Patient wishes to discuss tx options.

## 2023-05-03 ENCOUNTER — APPOINTMENT (RX ONLY)
Dept: URBAN - METROPOLITAN AREA CLINIC 329 | Facility: CLINIC | Age: 33
Setting detail: DERMATOLOGY
End: 2023-05-03

## 2023-05-03 DIAGNOSIS — I83.9 ASYMPTOMATIC VARICOSE VEINS OF LOWER EXTREMITIES: ICD-10-CM

## 2023-05-03 DIAGNOSIS — L82.1 OTHER SEBORRHEIC KERATOSIS: ICD-10-CM

## 2023-05-03 PROBLEM — I83.91 ASYMPTOMATIC VARICOSE VEINS OF RIGHT LOWER EXTREMITY: Status: ACTIVE | Noted: 2023-05-03

## 2023-05-03 PROCEDURE — ? COUNSELING

## 2023-05-03 PROCEDURE — 99212 OFFICE O/P EST SF 10 MIN: CPT

## 2023-05-03 PROCEDURE — ? RECOMMENDATIONS

## 2023-05-03 ASSESSMENT — LOCATION DETAILED DESCRIPTION DERM
LOCATION DETAILED: RIGHT THENAR EMINENCE
LOCATION DETAILED: RIGHT ANTERIOR DISTAL THIGH
LOCATION DETAILED: RIGHT ANTERIOR DISTAL THIGH
LOCATION DETAILED: LEFT PROXIMAL PALMAR INDEX FINGER

## 2023-05-03 ASSESSMENT — LOCATION ZONE DERM
LOCATION ZONE: HAND
LOCATION ZONE: FINGER
LOCATION ZONE: LEG
LOCATION ZONE: LEG

## 2023-05-03 ASSESSMENT — LOCATION SIMPLE DESCRIPTION DERM
LOCATION SIMPLE: RIGHT HAND
LOCATION SIMPLE: LEFT INDEX FINGER
LOCATION SIMPLE: RIGHT THIGH
LOCATION SIMPLE: RIGHT THIGH

## 2023-05-03 NOTE — PROCEDURE: RECOMMENDATIONS
Detail Level: Zone
Recommendation Preamble: The following recommendations were made during the visit:the vein doctor hand out was given
Render Risk Assessment In Note?: no

## 2023-05-05 ENCOUNTER — APPOINTMENT (RX ONLY)
Dept: URBAN - METROPOLITAN AREA CLINIC 329 | Facility: CLINIC | Age: 33
Setting detail: DERMATOLOGY
End: 2023-05-05

## 2023-05-05 DIAGNOSIS — L71.8 OTHER ROSACEA: ICD-10-CM | Status: STABLE

## 2023-05-05 DIAGNOSIS — L01.01 NON-BULLOUS IMPETIGO: ICD-10-CM | Status: INADEQUATELY CONTROLLED

## 2023-05-05 PROCEDURE — ? COUNSELING

## 2023-05-05 PROCEDURE — ? PRESCRIPTION MEDICATION MANAGEMENT

## 2023-05-05 PROCEDURE — ? PRESCRIPTION

## 2023-05-05 PROCEDURE — ? FULL BODY SKIN EXAM - DECLINED

## 2023-05-05 PROCEDURE — 99213 OFFICE O/P EST LOW 20 MIN: CPT

## 2023-05-05 PROCEDURE — ? ADDITIONAL NOTES

## 2023-05-05 RX ORDER — MUPIROCIN 20 MG/G
OINTMENT TOPICAL
Qty: 22 | Refills: 0 | Status: ERX | COMMUNITY
Start: 2023-05-05

## 2023-05-05 RX ADMIN — MUPIROCIN: 20 OINTMENT TOPICAL at 00:00

## 2023-05-05 ASSESSMENT — LOCATION SIMPLE DESCRIPTION DERM
LOCATION SIMPLE: RIGHT CHEEK
LOCATION SIMPLE: CHIN
LOCATION SIMPLE: LEFT CHEEK

## 2023-05-05 ASSESSMENT — LOCATION ZONE DERM: LOCATION ZONE: FACE

## 2023-05-05 ASSESSMENT — LOCATION DETAILED DESCRIPTION DERM
LOCATION DETAILED: RIGHT INFERIOR CENTRAL MALAR CHEEK
LOCATION DETAILED: LEFT CHIN
LOCATION DETAILED: LEFT INFERIOR CENTRAL MALAR CHEEK

## 2023-05-05 NOTE — PROCEDURE: ADDITIONAL NOTES
Render Risk Assessment In Note?: no
Detail Level: Simple
Additional Notes: She is also taking Valacyclovir just in case it is herpetic.

## 2023-05-05 NOTE — PROCEDURE: PRESCRIPTION MEDICATION MANAGEMENT
Continue Regimen: Valacyclovir
Render In Strict Bullet Format?: No
Initiate Treatment: Mupirocin bid
Detail Level: Zone
Plan: Do not apply treatment regimen to chin area.

## 2023-05-05 NOTE — HPI: SKIN LESION
Is This A New Presentation, Or A Follow-Up?: Skin Lesion
Additional History: She is concerned she may have staph.

## 2023-06-12 ENCOUNTER — APPOINTMENT (RX ONLY)
Dept: URBAN - METROPOLITAN AREA CLINIC 329 | Facility: CLINIC | Age: 33
Setting detail: DERMATOLOGY
End: 2023-06-12

## 2023-06-12 DIAGNOSIS — L72.8 OTHER FOLLICULAR CYSTS OF THE SKIN AND SUBCUTANEOUS TISSUE: ICD-10-CM

## 2023-06-12 DIAGNOSIS — I78.8 OTHER DISEASES OF CAPILLARIES: ICD-10-CM

## 2023-06-12 DIAGNOSIS — L30.9 DERMATITIS, UNSPECIFIED: ICD-10-CM | Status: INADEQUATELY CONTROLLED

## 2023-06-12 PROCEDURE — ? FULL BODY SKIN EXAM - DECLINED

## 2023-06-12 PROCEDURE — ? ADDITIONAL NOTES

## 2023-06-12 PROCEDURE — ? COUNSELING

## 2023-06-12 PROCEDURE — 99214 OFFICE O/P EST MOD 30 MIN: CPT

## 2023-06-12 PROCEDURE — ? PRESCRIPTION MEDICATION MANAGEMENT

## 2023-06-12 PROCEDURE — ? PRESCRIPTION

## 2023-06-12 RX ORDER — FLUTICASONE PROPIONATE 0.05 MG/G
OINTMENT TOPICAL
Qty: 15 | Refills: 2 | Status: ERX | COMMUNITY
Start: 2023-06-12

## 2023-06-12 RX ADMIN — FLUTICASONE PROPIONATE: 0.05 OINTMENT TOPICAL at 00:00

## 2023-06-12 ASSESSMENT — LOCATION ZONE DERM
LOCATION ZONE: TRUNK
LOCATION ZONE: NOSE
LOCATION ZONE: NECK

## 2023-06-12 ASSESSMENT — LOCATION DETAILED DESCRIPTION DERM
LOCATION DETAILED: LEFT INFERIOR ANTERIOR NECK
LOCATION DETAILED: NASAL TIP
LOCATION DETAILED: PERIUMBILICAL SKIN

## 2023-06-12 ASSESSMENT — LOCATION SIMPLE DESCRIPTION DERM
LOCATION SIMPLE: LEFT ANTERIOR NECK
LOCATION SIMPLE: ABDOMEN
LOCATION SIMPLE: NOSE

## 2023-07-13 ENCOUNTER — RX ONLY (OUTPATIENT)
Age: 33
Setting detail: RX ONLY
End: 2023-07-13

## 2023-07-13 RX ORDER — MINOCYCLINE HYDROCHLORIDE 50 MG/1
CAPSULE ORAL
Qty: 60 | Refills: 3 | Status: ERX | COMMUNITY
Start: 2023-07-13

## 2023-07-21 ENCOUNTER — APPOINTMENT (RX ONLY)
Dept: URBAN - METROPOLITAN AREA CLINIC 329 | Facility: CLINIC | Age: 33
Setting detail: DERMATOLOGY
End: 2023-07-21

## 2023-07-21 ENCOUNTER — RX ONLY (OUTPATIENT)
Age: 33
Setting detail: RX ONLY
End: 2023-07-21

## 2023-07-21 DIAGNOSIS — L71.8 OTHER ROSACEA: ICD-10-CM | Status: WELL CONTROLLED

## 2023-07-21 DIAGNOSIS — L30.9 DERMATITIS, UNSPECIFIED: ICD-10-CM | Status: INADEQUATELY CONTROLLED

## 2023-07-21 PROCEDURE — 99214 OFFICE O/P EST MOD 30 MIN: CPT

## 2023-07-21 PROCEDURE — ? PRESCRIPTION

## 2023-07-21 PROCEDURE — ? COUNSELING

## 2023-07-21 PROCEDURE — ? MDM - TREATMENT GOALS

## 2023-07-21 PROCEDURE — ? PRESCRIPTION MEDICATION MANAGEMENT

## 2023-07-21 PROCEDURE — ? FULL BODY SKIN EXAM - DECLINED

## 2023-07-21 RX ORDER — ALCLOMETASONE DIPROPIONATE 0.5 MG/G
CREAM TOPICAL
Qty: 15 | Refills: 0 | Status: ERX | COMMUNITY
Start: 2023-07-21

## 2023-07-21 RX ORDER — MINOCYCLINE 15 MG/G
AEROSOL, FOAM TOPICAL
Qty: 30 | Refills: 11 | Status: ERX

## 2023-07-21 RX ORDER — IVERMECTIN 10 MG/G
CREAM TOPICAL
Qty: 45 | Refills: 11 | Status: ERX

## 2023-07-21 RX ADMIN — ALCLOMETASONE DIPROPIONATE: 0.5 CREAM TOPICAL at 00:00

## 2023-07-21 ASSESSMENT — LOCATION ZONE DERM: LOCATION ZONE: FACE

## 2023-07-21 ASSESSMENT — LOCATION SIMPLE DESCRIPTION DERM
LOCATION SIMPLE: RIGHT CHEEK
LOCATION SIMPLE: RIGHT FOREHEAD
LOCATION SIMPLE: LEFT CHEEK

## 2023-07-21 ASSESSMENT — LOCATION DETAILED DESCRIPTION DERM
LOCATION DETAILED: RIGHT SUPERIOR FOREHEAD
LOCATION DETAILED: LEFT INFERIOR CENTRAL MALAR CHEEK
LOCATION DETAILED: RIGHT INFERIOR CENTRAL MALAR CHEEK

## 2023-07-21 NOTE — HPI: RASH
How Severe Is Your Rash?: mild
Is This A New Presentation, Or A Follow-Up?: Rash
Additional History: Patient notes that she went through a traumatic event on Sunday and noticed this red area popped up a few days after the event. She thinks that it is hives but just wants to make sure it is nothing to worry about. Patient has used lotion and her normal skin care routine.

## 2023-07-21 NOTE — PROCEDURE: PRESCRIPTION MEDICATION MANAGEMENT
Detail Level: Zone
Continue Regimen: Soolantra and Zilxi refills provided today
Render In Strict Bullet Format?: No
Plan: Offered topical steroid and patient is in agreement. She states that she will only use it if anything worsens. \\nRecommended patient to continue to use Aquaphor or moisturizer daily. Advised patient to avoid applying irritating products to this area for a few days.
Initiate Treatment: Alclometasone cream apply to aa bid x 2 weeks as needed

## 2023-08-16 ENCOUNTER — APPOINTMENT (RX ONLY)
Dept: URBAN - METROPOLITAN AREA CLINIC 329 | Facility: CLINIC | Age: 33
Setting detail: DERMATOLOGY
End: 2023-08-16

## 2023-08-16 DIAGNOSIS — D22 MELANOCYTIC NEVI: ICD-10-CM

## 2023-08-16 DIAGNOSIS — D18.0 HEMANGIOMA: ICD-10-CM

## 2023-08-16 DIAGNOSIS — L82.0 INFLAMED SEBORRHEIC KERATOSIS: ICD-10-CM

## 2023-08-16 DIAGNOSIS — L71.8 OTHER ROSACEA: ICD-10-CM | Status: WELL CONTROLLED

## 2023-08-16 DIAGNOSIS — L82.1 OTHER SEBORRHEIC KERATOSIS: ICD-10-CM

## 2023-08-16 DIAGNOSIS — L57.0 ACTINIC KERATOSIS: ICD-10-CM

## 2023-08-16 DIAGNOSIS — L57.8 OTHER SKIN CHANGES DUE TO CHRONIC EXPOSURE TO NONIONIZING RADIATION: ICD-10-CM

## 2023-08-16 DIAGNOSIS — L81.4 OTHER MELANIN HYPERPIGMENTATION: ICD-10-CM

## 2023-08-16 PROBLEM — D22.5 MELANOCYTIC NEVI OF TRUNK: Status: ACTIVE | Noted: 2023-08-16

## 2023-08-16 PROBLEM — D18.01 HEMANGIOMA OF SKIN AND SUBCUTANEOUS TISSUE: Status: ACTIVE | Noted: 2023-08-16

## 2023-08-16 PROCEDURE — ? COUNSELING

## 2023-08-16 PROCEDURE — ? TREATMENT REGIMEN

## 2023-08-16 PROCEDURE — 99214 OFFICE O/P EST MOD 30 MIN: CPT | Mod: 25

## 2023-08-16 PROCEDURE — ? BENIGN DESTRUCTION

## 2023-08-16 PROCEDURE — ? LIQUID NITROGEN

## 2023-08-16 PROCEDURE — ? DERMATOSCOPIC EVALUATION

## 2023-08-16 PROCEDURE — ? ADDITIONAL NOTES

## 2023-08-16 PROCEDURE — ? PRESCRIPTION MEDICATION MANAGEMENT

## 2023-08-16 PROCEDURE — 17110 DESTRUCTION B9 LES UP TO 14: CPT

## 2023-08-16 PROCEDURE — 17000 DESTRUCT PREMALG LESION: CPT | Mod: 59

## 2023-08-16 PROCEDURE — ? FULL BODY SKIN EXAM

## 2023-08-16 ASSESSMENT — LOCATION DETAILED DESCRIPTION DERM
LOCATION DETAILED: RIGHT CENTRAL FRONTAL SCALP
LOCATION DETAILED: EPIGASTRIC SKIN
LOCATION DETAILED: RIGHT INFERIOR CENTRAL MALAR CHEEK
LOCATION DETAILED: RIGHT CENTRAL MALAR CHEEK
LOCATION DETAILED: RIGHT CENTRAL MALAR CHEEK
LOCATION DETAILED: LEFT CENTRAL MALAR CHEEK
LOCATION DETAILED: RIGHT SUPERIOR FOREHEAD
LOCATION DETAILED: LEFT INFERIOR UPPER BACK
LOCATION DETAILED: RIGHT SUPERIOR FOREHEAD
LOCATION DETAILED: RIGHT SUPERIOR MEDIAL BUCCAL CHEEK

## 2023-08-16 ASSESSMENT — LOCATION SIMPLE DESCRIPTION DERM
LOCATION SIMPLE: LEFT CHEEK
LOCATION SIMPLE: RIGHT CHEEK
LOCATION SIMPLE: RIGHT FOREHEAD
LOCATION SIMPLE: RIGHT SCALP
LOCATION SIMPLE: RIGHT CHEEK
LOCATION SIMPLE: ABDOMEN
LOCATION SIMPLE: RIGHT FOREHEAD
LOCATION SIMPLE: LEFT UPPER BACK

## 2023-08-16 ASSESSMENT — LOCATION ZONE DERM
LOCATION ZONE: SCALP
LOCATION ZONE: TRUNK
LOCATION ZONE: FACE
LOCATION ZONE: FACE

## 2023-08-16 NOTE — PROCEDURE: PRESCRIPTION MEDICATION MANAGEMENT
Render In Strict Bullet Format?: No
Plan: Minocycline 50mg at flare
Detail Level: Zone
Continue Regimen: Zilxi \\nSoolantra

## 2023-08-16 NOTE — PROCEDURE: LIQUID NITROGEN
Post-Care Instructions: I reviewed with the patient in detail post-care instructions. Patient is to wear sunprotection, and avoid picking at any of the treated lesions. Pt may apply Vaseline to crusted or scabbing areas.
Render Post-Care Instructions In Note?: no
Show Aperture Variable?: Yes
Duration Of Freeze Thaw-Cycle (Seconds): 1
Consent: The patient's consent was obtained including but not limited to risks of crusting, scabbing, blistering, scarring, darker or lighter pigmentary change, recurrence, incomplete removal and infection.
Detail Level: Detailed

## 2023-08-16 NOTE — PROCEDURE: BENIGN DESTRUCTION
Detail Level: Detailed
Treatment Number (Will Not Render If 0): 0
Consent: The patient's consent was obtained including but not limited to risks of crusting, scabbing, blistering, scarring, darker or lighter pigmentary change, recurrence, incomplete removal and infection.
Medical Necessity Information: It is in your best interest to select a reason for this procedure from the list below. All of these items fulfill various CMS LCD requirements except the new and changing color options.
Render Post-Care Instructions In Note?: no
Post-Care Instructions: I reviewed with the patient in detail post-care instructions. Patient is to wear sunprotection, and avoid picking at any of the treated lesions. Pt may apply Vaseline to crusted or scabbing areas.
Anesthesia Volume In Cc: 0.5
Medical Necessity Clause: This procedure was medically necessary because the lesions that were treated were:

## 2023-08-16 NOTE — PROCEDURE: COUNSELING
Detail Level: Generalized
Detail Level: Detailed
Detail Level: Zone
Z Plasty Text: The lesion was extirpated to the level of the fat with a #15 scalpel blade.  Given the location of the defect, shape of the defect and the proximity to free margins a Z-plasty was deemed most appropriate for repair.  Using a sterile surgical marker, the appropriate transposition arms of the Z-plasty were drawn incorporating the defect and placing the expected incisions within the relaxed skin tension lines where possible.    The area thus outlined was incised deep to adipose tissue with a #15 scalpel blade.  The skin margins were undermined to an appropriate distance in all directions utilizing iris scissors.  The opposing transposition arms were then transposed into place in opposite direction and anchored with interrupted buried subcutaneous sutures.

## 2023-11-29 ENCOUNTER — APPOINTMENT (RX ONLY)
Dept: URBAN - METROPOLITAN AREA CLINIC 329 | Facility: CLINIC | Age: 33
Setting detail: DERMATOLOGY
End: 2023-11-29

## 2023-11-29 DIAGNOSIS — L20.89 OTHER ATOPIC DERMATITIS: ICD-10-CM

## 2023-11-29 PROBLEM — L30.9 DERMATITIS, UNSPECIFIED: Status: ACTIVE | Noted: 2023-11-29

## 2023-11-29 PROCEDURE — 99213 OFFICE O/P EST LOW 20 MIN: CPT

## 2023-11-29 PROCEDURE — ? PRESCRIPTION

## 2023-11-29 PROCEDURE — ? COUNSELING

## 2023-11-29 PROCEDURE — ? PRESCRIPTION MEDICATION MANAGEMENT

## 2023-11-29 PROCEDURE — ? FULL BODY SKIN EXAM - DECLINED

## 2023-11-29 RX ORDER — MOMETASONE FUROATE 1 MG/G
OINTMENT TOPICAL
Qty: 45 | Refills: 5 | Status: ERX | COMMUNITY
Start: 2023-11-29

## 2023-11-29 RX ADMIN — MOMETASONE FUROATE: 1 OINTMENT TOPICAL at 00:00

## 2023-11-29 ASSESSMENT — BSA RASH: BSA RASH: 10

## 2023-11-29 ASSESSMENT — LOCATION SIMPLE DESCRIPTION DERM
LOCATION SIMPLE: RIGHT EAR
LOCATION SIMPLE: LEFT EAR

## 2023-11-29 ASSESSMENT — LOCATION DETAILED DESCRIPTION DERM
LOCATION DETAILED: RIGHT SUPERIOR CRUS OF ANTIHELIX
LOCATION DETAILED: LEFT SUPERIOR HELIX

## 2023-11-29 ASSESSMENT — SEVERITY ASSESSMENT 2020: SEVERITY 2020: MILD

## 2023-11-29 ASSESSMENT — LOCATION ZONE DERM: LOCATION ZONE: EAR

## 2023-11-29 ASSESSMENT — ITCH NUMERIC RATING SCALE: HOW SEVERE IS YOUR ITCHING?: 4

## 2023-11-29 NOTE — PROCEDURE: PRESCRIPTION MEDICATION MANAGEMENT
Detail Level: Zone
Initiate Treatment: mometasone 0.1 % topical ointment \\nApply twice daily on affected areas when flared
Render In Strict Bullet Format?: No

## 2023-11-29 NOTE — HPI: RASH
What Type Of Note Output Would You Prefer (Optional)?: Bullet Format
How Severe Is Your Rash?: mild
Is This A New Presentation, Or A Follow-Up?: Rash
Additional History: Patient states she started to develop a rash starting in her wrists and ears about a month ago. She states that she is allergic to an ingredient found in most soaps. Patient explained that the rash went away but then reappeared just on her ears. She has tried an OTC cortisone cream which made it worse. She is currently using moisturizers and aquaphor. Patient explains that the rash presents as red, bumpy and itchy. She states that she has had a recent illness but she explains that the rash has been appearing before her illness. She was prescribed antibiotics and steroids when being sick. She states she has not have any changes in detergents or personal care items. She started using a satin pillowcase but switched back to a regular one after the rash appeared on her ears.

## 2024-02-19 ENCOUNTER — APPOINTMENT (RX ONLY)
Dept: URBAN - METROPOLITAN AREA CLINIC 329 | Facility: CLINIC | Age: 34
Setting detail: DERMATOLOGY
End: 2024-02-19

## 2024-02-19 DIAGNOSIS — L71.8 OTHER ROSACEA: ICD-10-CM | Status: IMPROVED

## 2024-02-19 DIAGNOSIS — L20.89 OTHER ATOPIC DERMATITIS: ICD-10-CM | Status: IMPROVED

## 2024-02-19 PROBLEM — L30.9 DERMATITIS, UNSPECIFIED: Status: ACTIVE | Noted: 2024-02-19

## 2024-02-19 PROCEDURE — ? FULL BODY SKIN EXAM - DECLINED

## 2024-02-19 PROCEDURE — 99213 OFFICE O/P EST LOW 20 MIN: CPT

## 2024-02-19 PROCEDURE — ? COUNSELING

## 2024-02-19 PROCEDURE — ? PRESCRIPTION MEDICATION MANAGEMENT

## 2024-02-19 ASSESSMENT — LOCATION SIMPLE DESCRIPTION DERM
LOCATION SIMPLE: LEFT EAR
LOCATION SIMPLE: LEFT CHEEK
LOCATION SIMPLE: RIGHT CHEEK
LOCATION SIMPLE: RIGHT EAR

## 2024-02-19 ASSESSMENT — LOCATION ZONE DERM
LOCATION ZONE: EAR
LOCATION ZONE: FACE

## 2024-02-19 ASSESSMENT — LOCATION DETAILED DESCRIPTION DERM
LOCATION DETAILED: LEFT CENTRAL MALAR CHEEK
LOCATION DETAILED: RIGHT SUPERIOR CRUS OF ANTIHELIX
LOCATION DETAILED: RIGHT INFERIOR CENTRAL MALAR CHEEK
LOCATION DETAILED: LEFT SUPERIOR HELIX

## 2024-02-19 NOTE — PROCEDURE: PRESCRIPTION MEDICATION MANAGEMENT
Detail Level: Zone
Continue Regimen: mometasone 0.1 % topical ointment Apply twice daily on affected areas when flared
Render In Strict Bullet Format?: No
Continue Regimen: Zilxi \\nSoolantra

## 2024-03-21 ENCOUNTER — RX ONLY (OUTPATIENT)
Age: 34
Setting detail: RX ONLY
End: 2024-03-21

## 2024-03-21 RX ORDER — MINOCYCLINE 15 MG/G
AEROSOL, FOAM TOPICAL
Qty: 30 | Refills: 11 | Status: ERX

## 2024-03-21 RX ORDER — IVERMECTIN 10 MG/G
CREAM TOPICAL
Qty: 45 | Refills: 11 | Status: ERX

## 2024-03-25 ENCOUNTER — APPOINTMENT (RX ONLY)
Dept: URBAN - METROPOLITAN AREA CLINIC 329 | Facility: CLINIC | Age: 34
Setting detail: DERMATOLOGY
End: 2024-03-25

## 2024-03-25 DIAGNOSIS — L60.8 OTHER NAIL DISORDERS: ICD-10-CM

## 2024-03-25 DIAGNOSIS — L71.8 OTHER ROSACEA: ICD-10-CM | Status: WELL CONTROLLED

## 2024-03-25 PROCEDURE — ? PRESCRIPTION MEDICATION MANAGEMENT

## 2024-03-25 PROCEDURE — ? FULL BODY SKIN EXAM - DECLINED

## 2024-03-25 PROCEDURE — ? COUNSELING

## 2024-03-25 PROCEDURE — 99214 OFFICE O/P EST MOD 30 MIN: CPT

## 2024-03-25 ASSESSMENT — LOCATION ZONE DERM
LOCATION ZONE: TOENAIL
LOCATION ZONE: FACE

## 2024-03-25 ASSESSMENT — LOCATION SIMPLE DESCRIPTION DERM
LOCATION SIMPLE: LEFT CHEEK
LOCATION SIMPLE: RIGHT CHEEK
LOCATION SIMPLE: LEFT 3RD TOE

## 2024-03-25 ASSESSMENT — LOCATION DETAILED DESCRIPTION DERM
LOCATION DETAILED: LEFT 3RD TOENAIL
LOCATION DETAILED: RIGHT INFERIOR CENTRAL MALAR CHEEK
LOCATION DETAILED: LEFT CENTRAL MALAR CHEEK

## 2024-03-25 NOTE — HPI: NAIL DISCOLORATION (MELANONYCHIA)
Is This A New Presentation, Or A Follow-Up?: Nail Discoloration
How Severe Is It?: mild
Additional History: Patient reports that a few weeks ago after a yoga class that she developed a dark color on one of her toenails and wants to ensure that this is a bruise rather than a possible fungus

## 2024-03-25 NOTE — PROCEDURE: REASSURANCE
Detail Level: Detailed
Hide Additional Notes?: No
Additional Notes (Optional): Explained that this does not look like a fungus and likely a result from trauma on this toe.

## 2024-03-25 NOTE — PROCEDURE: PRESCRIPTION MEDICATION MANAGEMENT
Render In Strict Bullet Format?: No
Plan: Advised patient to keep us updated regarding if her insurance covers Zilxi at [facility] or if we need to send this to a different pharmacy, she was given samples to ensure she stays well controlled. She will follow up with us in August
Detail Level: Zone
Samples Given: Zilxi
Continue Regimen: Zilxi \\nSoolantra

## 2024-04-02 ENCOUNTER — RX ONLY (OUTPATIENT)
Age: 34
Setting detail: RX ONLY
End: 2024-04-02

## 2024-04-02 RX ORDER — MINOCYCLINE 15 MG/G
AEROSOL, FOAM TOPICAL
Qty: 30 | Refills: 11 | Status: ERX

## 2024-04-02 RX ORDER — IVERMECTIN 10 MG/G
CREAM TOPICAL
Qty: 45 | Refills: 11 | Status: ERX

## 2024-07-23 ENCOUNTER — RX ONLY (OUTPATIENT)
Age: 34
Setting detail: RX ONLY
End: 2024-07-23

## 2024-07-23 RX ORDER — IVERMECTIN 10 MG/G
CREAM TOPICAL
Qty: 45 | Refills: 11 | Status: ERX

## 2024-08-28 ENCOUNTER — APPOINTMENT (RX ONLY)
Dept: URBAN - METROPOLITAN AREA CLINIC 329 | Facility: CLINIC | Age: 34
Setting detail: DERMATOLOGY
End: 2024-08-28

## 2024-08-28 DIAGNOSIS — D22 MELANOCYTIC NEVI: ICD-10-CM

## 2024-08-28 DIAGNOSIS — L91.8 OTHER HYPERTROPHIC DISORDERS OF THE SKIN: ICD-10-CM

## 2024-08-28 DIAGNOSIS — L71.8 OTHER ROSACEA: ICD-10-CM

## 2024-08-28 DIAGNOSIS — L81.4 OTHER MELANIN HYPERPIGMENTATION: ICD-10-CM

## 2024-08-28 DIAGNOSIS — L57.8 OTHER SKIN CHANGES DUE TO CHRONIC EXPOSURE TO NONIONIZING RADIATION: ICD-10-CM

## 2024-08-28 DIAGNOSIS — L82.1 OTHER SEBORRHEIC KERATOSIS: ICD-10-CM

## 2024-08-28 DIAGNOSIS — D18.0 HEMANGIOMA: ICD-10-CM

## 2024-08-28 PROBLEM — D22.5 MELANOCYTIC NEVI OF TRUNK: Status: ACTIVE | Noted: 2024-08-28

## 2024-08-28 PROBLEM — D18.01 HEMANGIOMA OF SKIN AND SUBCUTANEOUS TISSUE: Status: ACTIVE | Noted: 2024-08-28

## 2024-08-28 PROBLEM — D22.72 MELANOCYTIC NEVI OF LEFT LOWER LIMB, INCLUDING HIP: Status: ACTIVE | Noted: 2024-08-28

## 2024-08-28 PROBLEM — D22.61 MELANOCYTIC NEVI OF RIGHT UPPER LIMB, INCLUDING SHOULDER: Status: ACTIVE | Noted: 2024-08-28

## 2024-08-28 PROBLEM — D22.71 MELANOCYTIC NEVI OF RIGHT LOWER LIMB, INCLUDING HIP: Status: ACTIVE | Noted: 2024-08-28

## 2024-08-28 PROCEDURE — ? TREATMENT REGIMEN

## 2024-08-28 PROCEDURE — ? DEFER

## 2024-08-28 PROCEDURE — ? PRESCRIPTION MEDICATION MANAGEMENT

## 2024-08-28 PROCEDURE — ? COUNSELING

## 2024-08-28 PROCEDURE — 99214 OFFICE O/P EST MOD 30 MIN: CPT

## 2024-08-28 ASSESSMENT — LOCATION SIMPLE DESCRIPTION DERM
LOCATION SIMPLE: UPPER BACK
LOCATION SIMPLE: CHEST
LOCATION SIMPLE: RIGHT CLAVICULAR SKIN
LOCATION SIMPLE: LEFT POSTERIOR THIGH
LOCATION SIMPLE: LEFT UPPER ARM
LOCATION SIMPLE: RIGHT THIGH
LOCATION SIMPLE: LEFT CHEEK
LOCATION SIMPLE: LEFT CALF
LOCATION SIMPLE: RIGHT UPPER ARM
LOCATION SIMPLE: RIGHT CHEEK
LOCATION SIMPLE: RIGHT BREAST
LOCATION SIMPLE: LEFT UPPER BACK
LOCATION SIMPLE: RIGHT FOREARM
LOCATION SIMPLE: RIGHT UPPER BACK
LOCATION SIMPLE: ANTERIOR SCALP
LOCATION SIMPLE: ABDOMEN

## 2024-08-28 ASSESSMENT — LOCATION DETAILED DESCRIPTION DERM
LOCATION DETAILED: RIGHT SUPERIOR MEDIAL UPPER BACK
LOCATION DETAILED: EPIGASTRIC SKIN
LOCATION DETAILED: LEFT DISTAL CALF
LOCATION DETAILED: LEFT CENTRAL MALAR CHEEK
LOCATION DETAILED: UPPER STERNUM
LOCATION DETAILED: RIGHT PERIAREOLAR BREAST 9-10:00 REGION
LOCATION DETAILED: LEFT DISTAL POSTERIOR THIGH
LOCATION DETAILED: MID-FRONTAL SCALP
LOCATION DETAILED: SUPERIOR THORACIC SPINE
LOCATION DETAILED: RIGHT ANTERIOR DISTAL UPPER ARM
LOCATION DETAILED: LEFT INFERIOR UPPER BACK
LOCATION DETAILED: RIGHT CLAVICULAR SKIN
LOCATION DETAILED: RIGHT ANTERIOR DISTAL THIGH
LOCATION DETAILED: LEFT PROXIMAL POSTERIOR THIGH
LOCATION DETAILED: RIGHT INFERIOR CENTRAL MALAR CHEEK
LOCATION DETAILED: LEFT ANTERIOR DISTAL UPPER ARM
LOCATION DETAILED: RIGHT PROXIMAL DORSAL FOREARM
LOCATION DETAILED: RIGHT VENTRAL DISTAL FOREARM

## 2024-08-28 ASSESSMENT — LOCATION ZONE DERM
LOCATION ZONE: SCALP
LOCATION ZONE: LEG
LOCATION ZONE: TRUNK
LOCATION ZONE: FACE
LOCATION ZONE: ARM

## 2024-08-28 NOTE — PROCEDURE: PRESCRIPTION MEDICATION MANAGEMENT
Render In Strict Bullet Format?: No
Plan: Advised patient to keep us updated regarding if her insurance covers Zilxi at [facility] or if we need to send this to a different pharmacy, she was given samples to ensure she stays well controlled. She will follow up with us in August
Detail Level: Zone
Samples Given: Zilxi
Continue Regimen: Zilxi \\nSoolantra
none

## 2024-12-16 ENCOUNTER — RX ONLY (RX ONLY)
Age: 34
End: 2024-12-16

## 2024-12-16 RX ORDER — IVERMECTIN 10 MG/G
CREAM TOPICAL
Qty: 45 | Refills: 11 | Status: ERX

## 2024-12-19 ENCOUNTER — APPOINTMENT (OUTPATIENT)
Dept: URBAN - METROPOLITAN AREA CLINIC 329 | Facility: CLINIC | Age: 34
Setting detail: DERMATOLOGY
End: 2024-12-19

## 2024-12-19 DIAGNOSIS — L85.3 XEROSIS CUTIS: ICD-10-CM

## 2024-12-19 DIAGNOSIS — Z71.89 OTHER SPECIFIED COUNSELING: ICD-10-CM

## 2024-12-19 DIAGNOSIS — L84 CORNS AND CALLOSITIES: ICD-10-CM

## 2024-12-19 DIAGNOSIS — L30.9 DERMATITIS, UNSPECIFIED: ICD-10-CM

## 2024-12-19 DIAGNOSIS — L73.8 OTHER SPECIFIED FOLLICULAR DISORDERS: ICD-10-CM

## 2024-12-19 DIAGNOSIS — L82.0 INFLAMED SEBORRHEIC KERATOSIS: ICD-10-CM

## 2024-12-19 DIAGNOSIS — L71.8 OTHER ROSACEA: ICD-10-CM

## 2024-12-19 PROCEDURE — ? COUNSELING

## 2024-12-19 PROCEDURE — ? PRESCRIPTION MEDICATION MANAGEMENT

## 2024-12-19 PROCEDURE — ? RECOMMENDATIONS

## 2024-12-19 PROCEDURE — ? FULL BODY SKIN EXAM - DECLINED

## 2024-12-19 PROCEDURE — 17110 DESTRUCTION B9 LES UP TO 14: CPT

## 2024-12-19 PROCEDURE — 99213 OFFICE O/P EST LOW 20 MIN: CPT | Mod: 25

## 2024-12-19 PROCEDURE — ? BENIGN DESTRUCTION

## 2024-12-19 PROCEDURE — ? SUNSCREEN RECOMMENDATIONS

## 2024-12-19 PROCEDURE — ? PRESCRIPTION

## 2024-12-19 RX ORDER — MOMETASONE FUROATE 1 MG/G
OINTMENT TOPICAL
Qty: 45 | Refills: 1 | Status: ERX

## 2024-12-19 ASSESSMENT — LOCATION SIMPLE DESCRIPTION DERM
LOCATION SIMPLE: LEFT FOREHEAD
LOCATION SIMPLE: LEFT CHEEK
LOCATION SIMPLE: LEFT FOREHEAD
LOCATION SIMPLE: LEFT HEEL
LOCATION SIMPLE: RIGHT CHEEK
LOCATION SIMPLE: RIGHT FOREHEAD
LOCATION SIMPLE: RIGHT CHEEK
LOCATION SIMPLE: LEFT FOOT
LOCATION SIMPLE: RIGHT PLANTAR SURFACE
LOCATION SIMPLE: FRONTAL SCALP
LOCATION SIMPLE: RIGHT ACHILLES SKIN
LOCATION SIMPLE: RIGHT ACHILLES SKIN
LOCATION SIMPLE: LEFT FOREARM
LOCATION SIMPLE: RIGHT FOREHEAD
LOCATION SIMPLE: LEFT CHEEK

## 2024-12-19 ASSESSMENT — LOCATION DETAILED DESCRIPTION DERM
LOCATION DETAILED: RIGHT INFERIOR MEDIAL MALAR CHEEK
LOCATION DETAILED: LEFT INFERIOR CENTRAL MALAR CHEEK
LOCATION DETAILED: LEFT VENTRAL PROXIMAL FOREARM
LOCATION DETAILED: RIGHT ACHILLES SKIN
LOCATION DETAILED: RIGHT INFERIOR CENTRAL MALAR CHEEK
LOCATION DETAILED: RIGHT FOREHEAD
LOCATION DETAILED: MEDIAL FRONTAL SCALP
LOCATION DETAILED: LEFT MEDIAL FOREHEAD
LOCATION DETAILED: RIGHT SUPERIOR MEDIAL FOREHEAD
LOCATION DETAILED: LEFT INFERIOR CENTRAL MALAR CHEEK
LOCATION DETAILED: LEFT MEDIAL FOREHEAD
LOCATION DETAILED: RIGHT MEDIAL MALAR CHEEK
LOCATION DETAILED: LEFT HEEL
LOCATION DETAILED: RIGHT PLANTAR FOREFOOT OVERLYING 2ND METATARSAL
LOCATION DETAILED: RIGHT MEDIAL FOREHEAD
LOCATION DETAILED: RIGHT FOREHEAD
LOCATION DETAILED: LEFT LATERAL DORSAL FOOT
LOCATION DETAILED: RIGHT INFERIOR MEDIAL MALAR CHEEK
LOCATION DETAILED: LEFT SUPERIOR FOREHEAD
LOCATION DETAILED: RIGHT ACHILLES SKIN
LOCATION DETAILED: LEFT INFERIOR LATERAL MALAR CHEEK
LOCATION DETAILED: LEFT FOREHEAD

## 2024-12-19 ASSESSMENT — ITCH NUMERIC RATING SCALE: HOW SEVERE IS YOUR ITCHING?: 3

## 2024-12-19 ASSESSMENT — LOCATION ZONE DERM
LOCATION ZONE: SCALP
LOCATION ZONE: ARM
LOCATION ZONE: FACE
LOCATION ZONE: FEET
LOCATION ZONE: FACE
LOCATION ZONE: FEET
LOCATION ZONE: LEG
LOCATION ZONE: LEG

## 2024-12-19 ASSESSMENT — BSA RASH: BSA RASH: 36

## 2024-12-19 NOTE — PROCEDURE: BENIGN DESTRUCTION
Anesthesia Volume In Cc: 0.5
Treatment Number (Will Not Render If 0): 0
Render Post-Care Instructions In Note?: no
Detail Level: Detailed
Consent: The patient's consent was obtained including but not limited to risks of crusting, scabbing, blistering, scarring, darker or lighter pigmentary change, recurrence, incomplete removal and infection.
Medical Necessity Clause: This procedure was medically necessary because the lesions that were treated were:
Medical Necessity Information: It is in your best interest to select a reason for this procedure from the list below. All of these items fulfill various CMS LCD requirements except the new and changing color options.
Post-Care Instructions: I reviewed with the patient in detail post-care instructions. Patient is to wear sunprotection, and avoid picking at any of the treated lesions. Pt may apply Vaseline to crusted or scabbing areas.

## 2024-12-19 NOTE — PROCEDURE: RECOMMENDATIONS
Recommendations (Free Text): IPL with Roselia
Recommendation Preamble: The following recommendations were made during the visit:
Detail Level: Zone
Render Risk Assessment In Note?: no
Recommendations (Free Text): Liquid exfoliation

## 2024-12-19 NOTE — PROCEDURE: PRESCRIPTION MEDICATION MANAGEMENT
Detail Level: Zone
Continue Regimen: Minocycline \\nSoolantra\\nZilxi
Render In Strict Bullet Format?: No

## 2025-01-20 ENCOUNTER — APPOINTMENT (OUTPATIENT)
Dept: URBAN - METROPOLITAN AREA CLINIC 329 | Facility: CLINIC | Age: 35
Setting detail: DERMATOLOGY
End: 2025-01-20

## 2025-01-20 DIAGNOSIS — L85.3 XEROSIS CUTIS: ICD-10-CM | Status: STABLE

## 2025-01-20 DIAGNOSIS — L72.0 EPIDERMAL CYST: ICD-10-CM | Status: INADEQUATELY CONTROLLED

## 2025-01-20 DIAGNOSIS — L90.5 SCAR CONDITIONS AND FIBROSIS OF SKIN: ICD-10-CM | Status: STABLE

## 2025-01-20 DIAGNOSIS — L57.8 OTHER SKIN CHANGES DUE TO CHRONIC EXPOSURE TO NONIONIZING RADIATION: ICD-10-CM | Status: STABLE

## 2025-01-20 PROCEDURE — 99213 OFFICE O/P EST LOW 20 MIN: CPT | Mod: 25

## 2025-01-20 PROCEDURE — 17110 DESTRUCTION B9 LES UP TO 14: CPT

## 2025-01-20 PROCEDURE — ? ELECTRODESICCATION

## 2025-01-20 PROCEDURE — ? RECOMMENDATIONS

## 2025-01-20 PROCEDURE — ? COUNSELING

## 2025-01-20 PROCEDURE — ? SUNSCREEN RECOMMENDATIONS

## 2025-01-20 PROCEDURE — ? FULL BODY SKIN EXAM - DECLINED

## 2025-01-20 ASSESSMENT — LOCATION ZONE DERM
LOCATION ZONE: FACE
LOCATION ZONE: ARM

## 2025-01-20 ASSESSMENT — LOCATION DETAILED DESCRIPTION DERM
LOCATION DETAILED: RIGHT FOREHEAD
LOCATION DETAILED: RIGHT SUPERIOR MEDIAL FOREHEAD
LOCATION DETAILED: LEFT MEDIAL FOREHEAD
LOCATION DETAILED: RIGHT ANTERIOR DISTAL UPPER ARM
LOCATION DETAILED: LEFT FOREHEAD
LOCATION DETAILED: LEFT CENTRAL MALAR CHEEK

## 2025-01-20 ASSESSMENT — LOCATION SIMPLE DESCRIPTION DERM
LOCATION SIMPLE: RIGHT UPPER ARM
LOCATION SIMPLE: LEFT CHEEK
LOCATION SIMPLE: LEFT FOREHEAD
LOCATION SIMPLE: RIGHT FOREHEAD

## 2025-01-20 NOTE — HPI: SKIN LESION
How Severe Is Your Skin Lesion?: mild
Is This A New Presentation, Or A Follow-Up?: Skin Lesions
Additional History: Patient explains that she has several lesions on her face that she is worried about. She explains that she has a lesion that was previously treated with three benign destruction but the patient states that the lesion is still present. She also explains that she has a lesion on her cheek and nose and her right arm. Patient would like to get Emily treated at her visit today.

## 2025-01-20 NOTE — PROCEDURE: RECOMMENDATIONS
Render Risk Assessment In Note?: no
Detail Level: Zone
Recommendations (Free Text): Moisturizer
Recommendation Preamble: The following recommendations were made during the visit:

## 2025-01-20 NOTE — PROCEDURE: ELECTRODESICCATION
Consent: The patient's consent was obtained including but not limited to risks of crusting, scabbing, blistering, scarring, darker or lighter pigmentary change, recurrence, incomplete removal and infection.
Medical Necessity Clause: This procedure was medically necessary because the lesions that were treated were:
Post-Care Instructions: I reviewed with the patient in detail post-care instructions. Patient is to wear sunprotection, and avoid picking at any of the treated lesions. Pt may apply Vaseline to crusted or scabbing areas
Detail Level: Simple
Medical Necessity Information: It is in your best interest to select a reason for this procedure from the list below. All of these items fulfill various CMS LCD requirements except the new and changing color options.
Include Z78.9 (Other Specified Conditions Influencing Health Status) As An Associated Diagnosis?: No

## 2025-01-22 ENCOUNTER — OFFICE VISIT (OUTPATIENT)
Age: 35
End: 2025-01-22

## 2025-01-22 VITALS
SYSTOLIC BLOOD PRESSURE: 122 MMHG | OXYGEN SATURATION: 98 % | RESPIRATION RATE: 17 BRPM | DIASTOLIC BLOOD PRESSURE: 84 MMHG | HEART RATE: 71 BPM | HEIGHT: 69 IN | BODY MASS INDEX: 29.62 KG/M2 | TEMPERATURE: 98.1 F | WEIGHT: 200 LBS

## 2025-01-22 DIAGNOSIS — Z78.9 ENGAGES IN TRAVEL ABROAD: Primary | ICD-10-CM

## 2025-01-22 RX ORDER — AZITHROMYCIN 500 MG/1
500 TABLET, FILM COATED ORAL DAILY
Qty: 3 TABLET | Refills: 0 | Status: SHIPPED | OUTPATIENT
Start: 2025-01-22 | End: 2025-01-25

## 2025-01-22 ASSESSMENT — ENCOUNTER SYMPTOMS
ABDOMINAL PAIN: 0
COUGH: 0
RHINORRHEA: 0
NAUSEA: 0
EYE PAIN: 0
EYES NEGATIVE: 1
SINUS PRESSURE: 0
SHORTNESS OF BREATH: 0

## 2025-01-22 NOTE — PROGRESS NOTES
PROGRESS NOTE    SUBJECTIVE:   Ny Coates is a 35 y.o. female seen in the [facility] located at [facility] for travel vaccination and prophylactic antibiotic coverage for travel diarrhea.     Chief Complaint    Immunizations           Other  Associated symptoms: no abdominal pain, no cough, no fatigue, no fever, no nausea, no rhinorrhea and no shortness of breath        Current Outpatient Medications   Medication Sig Dispense Refill    azithromycin (ZITHROMAX) 500 MG tablet Take 1 tablet by mouth daily for 3 days 3 tablet 0     No current facility-administered medications for this visit.      Allergies   Allergen Reactions    Latex Other (See Comments)    Methylisothiazolinone Other (See Comments) and Rash       Social History     Tobacco Use    Smoking status: Never    Smokeless tobacco: Never        Review of Systems   Constitutional:  Negative for fatigue and fever.   HENT:  Negative for rhinorrhea and sinus pressure.    Eyes: Negative.  Negative for pain.   Respiratory:  Negative for cough and shortness of breath.    Cardiovascular: Negative.    Gastrointestinal:  Negative for abdominal pain and nausea.   Musculoskeletal: Negative.    Neurological:  Negative for dizziness, weakness, light-headedness and numbness.          OBJECTIVE:  /84 (Site: Right Upper Arm, Position: Sitting, Cuff Size: Medium Adult)   Pulse 71   Temp 98.1 °F (36.7 °C) (Oral)   Resp 17   Ht 1.753 m (5' 9\")   Wt 90.7 kg (200 lb)   SpO2 98%   BMI 29.53 kg/m²      No results found for this visit on 01/22/25.    Physical Exam  Constitutional:       Appearance: Normal appearance.   HENT:      Right Ear: Tympanic membrane normal.      Left Ear: Tympanic membrane normal.      Mouth/Throat:      Pharynx: No oropharyngeal exudate.   Eyes:      Extraocular Movements: Extraocular movements intact.      Pupils: Pupils are equal, round, and reactive to light.   Cardiovascular:      Rate and Rhythm: Normal rate

## 2025-02-28 ENCOUNTER — APPOINTMENT (OUTPATIENT)
Dept: URBAN - METROPOLITAN AREA CLINIC 329 | Facility: CLINIC | Age: 35
Setting detail: DERMATOLOGY
End: 2025-02-28

## 2025-02-28 DIAGNOSIS — L30.9 DERMATITIS, UNSPECIFIED: ICD-10-CM | Status: INADEQUATELY CONTROLLED

## 2025-02-28 PROCEDURE — ? PRESCRIPTION MEDICATION MANAGEMENT

## 2025-02-28 PROCEDURE — 99212 OFFICE O/P EST SF 10 MIN: CPT

## 2025-02-28 PROCEDURE — ? KOH PREP

## 2025-02-28 PROCEDURE — ? FULL BODY SKIN EXAM - DECLINED

## 2025-02-28 PROCEDURE — ? COUNSELING

## 2025-02-28 PROCEDURE — 87220 TISSUE EXAM FOR FUNGI: CPT

## 2025-02-28 PROCEDURE — ? PRESCRIPTION

## 2025-02-28 RX ORDER — TRIAMCINOLONE ACETONIDE 1 MG/G
CREAM TOPICAL
Qty: 80 | Refills: 2 | Status: ERX | COMMUNITY
Start: 2025-02-28

## 2025-02-28 RX ADMIN — TRIAMCINOLONE ACETONIDE: 1 CREAM TOPICAL at 00:00

## 2025-02-28 ASSESSMENT — BSA RASH: BSA RASH: 7

## 2025-02-28 ASSESSMENT — LOCATION DETAILED DESCRIPTION DERM
LOCATION DETAILED: RIGHT LATERAL PROXIMAL UPPER ARM
LOCATION DETAILED: RIGHT LATERAL PROXIMAL UPPER ARM

## 2025-02-28 ASSESSMENT — LOCATION ZONE DERM
LOCATION ZONE: ARM
LOCATION ZONE: ARM

## 2025-02-28 ASSESSMENT — PAIN INTENSITY VAS: HOW INTENSE IS YOUR PAIN 0 BEING NO PAIN, 10 BEING THE MOST SEVERE PAIN POSSIBLE?: NO PAIN

## 2025-02-28 ASSESSMENT — SEVERITY ASSESSMENT: SEVERITY: ALMOST CLEAR

## 2025-02-28 ASSESSMENT — LOCATION SIMPLE DESCRIPTION DERM
LOCATION SIMPLE: RIGHT UPPER ARM
LOCATION SIMPLE: RIGHT UPPER ARM

## 2025-02-28 ASSESSMENT — ITCH NUMERIC RATING SCALE: HOW SEVERE IS YOUR ITCHING?: 1

## 2025-02-28 NOTE — HPI: RASH
How Severe Is Your Rash?: moderate
Is This A New Presentation, Or A Follow-Up?: Rash
Additional History: Patient is using anti fungal otc

## 2025-02-28 NOTE — PROCEDURE: KOH PREP
Detail Level: Detailed
Cpt Desired: 01321
Showing: no hyphae
Koh Intro Text (From The.....): A KOH prep was ordered and evaluated from the
Koh Procedure Text (Tissue Harvesting Technique): A 15-blade scalpel was used to scrape the skin. The skin scrapings were placed on a glass slide, covered with a coverslip and a KOH solution was applied.

## 2025-03-17 ENCOUNTER — RX ONLY (RX ONLY)
Age: 35
End: 2025-03-17

## 2025-03-17 RX ORDER — IVERMECTIN 10 MG/G
CREAM TOPICAL
Qty: 45 | Refills: 11 | Status: ERX

## 2025-03-17 RX ORDER — MINOCYCLINE 15 MG/G
AEROSOL, FOAM TOPICAL
Qty: 30 | Refills: 11 | Status: ERX

## 2025-04-15 ENCOUNTER — OFFICE VISIT (OUTPATIENT)
Age: 35
End: 2025-04-15

## 2025-04-15 VITALS
HEART RATE: 85 BPM | HEIGHT: 69 IN | WEIGHT: 200 LBS | DIASTOLIC BLOOD PRESSURE: 68 MMHG | BODY MASS INDEX: 29.62 KG/M2 | TEMPERATURE: 98 F | RESPIRATION RATE: 15 BRPM | OXYGEN SATURATION: 98 % | SYSTOLIC BLOOD PRESSURE: 116 MMHG

## 2025-04-15 DIAGNOSIS — B96.89 ACUTE BACTERIAL SINUSITIS: Primary | ICD-10-CM

## 2025-04-15 DIAGNOSIS — J01.90 ACUTE BACTERIAL SINUSITIS: Primary | ICD-10-CM

## 2025-04-15 RX ORDER — CLOTRIMAZOLE AND BETAMETHASONE DIPROPIONATE 10; .64 MG/G; MG/G
1 CREAM TOPICAL DAILY
COMMUNITY
Start: 2024-08-21

## 2025-04-15 RX ORDER — MINOCYCLINE 15 MG/G
1 AEROSOL, FOAM TOPICAL PRN
COMMUNITY

## 2025-04-15 RX ORDER — IVERMECTIN 10 MG/G
1 CREAM TOPICAL DAILY
COMMUNITY

## 2025-04-15 ASSESSMENT — ENCOUNTER SYMPTOMS
SINUS PRESSURE: 1
COUGH: 1
SORE THROAT: 0
SINUS PAIN: 1

## 2025-04-15 NOTE — PROGRESS NOTES
have reviewed the patient's medication list, past medical, family, social, and surgical history in detail and updated the patient record appropriately.    Angela Mendoza, APRN - CNP

## 2025-04-29 ENCOUNTER — TELEPHONE (OUTPATIENT)
Age: 35
End: 2025-04-29

## 2025-04-29 NOTE — TELEPHONE ENCOUNTER
Call placed to evaluate symptoms reported on 4/15/25. Patient admits that all symptoms have resolved and ABT therapy was tolerated well. Patient with no further concerns at this time.

## 2025-05-27 ENCOUNTER — RX ONLY (RX ONLY)
Age: 35
End: 2025-05-27

## 2025-05-27 RX ORDER — MINOCYCLINE 15 MG/G
AEROSOL, FOAM TOPICAL
Qty: 30 | Refills: 11 | Status: ERX

## 2025-06-19 ENCOUNTER — APPOINTMENT (OUTPATIENT)
Dept: URBAN - METROPOLITAN AREA CLINIC 329 | Facility: CLINIC | Age: 35
Setting detail: DERMATOLOGY
End: 2025-06-19

## 2025-06-19 DIAGNOSIS — L82.1 OTHER SEBORRHEIC KERATOSIS: ICD-10-CM

## 2025-06-19 DIAGNOSIS — L57.8 OTHER SKIN CHANGES DUE TO CHRONIC EXPOSURE TO NONIONIZING RADIATION: ICD-10-CM

## 2025-06-19 DIAGNOSIS — D18.0 HEMANGIOMA: ICD-10-CM

## 2025-06-19 DIAGNOSIS — L71.8 OTHER ROSACEA: ICD-10-CM | Status: WELL CONTROLLED

## 2025-06-19 DIAGNOSIS — L82.0 INFLAMED SEBORRHEIC KERATOSIS: ICD-10-CM

## 2025-06-19 DIAGNOSIS — L81.4 OTHER MELANIN HYPERPIGMENTATION: ICD-10-CM

## 2025-06-19 DIAGNOSIS — D22 MELANOCYTIC NEVI: ICD-10-CM

## 2025-06-19 PROBLEM — D18.01 HEMANGIOMA OF SKIN AND SUBCUTANEOUS TISSUE: Status: ACTIVE | Noted: 2025-06-19

## 2025-06-19 PROBLEM — D22.5 MELANOCYTIC NEVI OF TRUNK: Status: ACTIVE | Noted: 2025-06-19

## 2025-06-19 PROBLEM — D22.61 MELANOCYTIC NEVI OF RIGHT UPPER LIMB, INCLUDING SHOULDER: Status: ACTIVE | Noted: 2025-06-19

## 2025-06-19 PROBLEM — D22.71 MELANOCYTIC NEVI OF RIGHT LOWER LIMB, INCLUDING HIP: Status: ACTIVE | Noted: 2025-06-19

## 2025-06-19 PROBLEM — D22.72 MELANOCYTIC NEVI OF LEFT LOWER LIMB, INCLUDING HIP: Status: ACTIVE | Noted: 2025-06-19

## 2025-06-19 PROCEDURE — ? SHAVE REMOVAL

## 2025-06-19 PROCEDURE — ? LIQUID NITROGEN

## 2025-06-19 PROCEDURE — ? TREATMENT REGIMEN

## 2025-06-19 PROCEDURE — ? COUNSELING

## 2025-06-19 PROCEDURE — ? PRESCRIPTION

## 2025-06-19 PROCEDURE — ? PRESCRIPTION MEDICATION MANAGEMENT

## 2025-06-19 PROCEDURE — ? BENIGN DESTRUCTION

## 2025-06-19 RX ORDER — IVERMECTIN 10 MG/G
CREAM TOPICAL
Qty: 45 | Refills: 6 | Status: ERX

## 2025-06-19 RX ORDER — MINOCYCLINE 15 MG/G
AEROSOL, FOAM TOPICAL
Qty: 30 | Refills: 3 | Status: ERX

## 2025-06-19 ASSESSMENT — LOCATION SIMPLE DESCRIPTION DERM
LOCATION SIMPLE: LEFT SCALP
LOCATION SIMPLE: RIGHT FOREARM
LOCATION SIMPLE: LEFT LIP
LOCATION SIMPLE: FRONTAL SCALP
LOCATION SIMPLE: LEFT UPPER ARM
LOCATION SIMPLE: RIGHT THIGH
LOCATION SIMPLE: LEFT EYEBROW
LOCATION SIMPLE: RIGHT CHEEK
LOCATION SIMPLE: LEFT CHEEK
LOCATION SIMPLE: ABDOMEN
LOCATION SIMPLE: LEFT FOREHEAD
LOCATION SIMPLE: LEFT CHEEK
LOCATION SIMPLE: RIGHT UPPER ARM
LOCATION SIMPLE: CHEST
LOCATION SIMPLE: RIGHT UPPER BACK
LOCATION SIMPLE: UPPER BACK
LOCATION SIMPLE: LEFT UPPER BACK
LOCATION SIMPLE: LEFT SCALP
LOCATION SIMPLE: LEFT CALF
LOCATION SIMPLE: LEFT POSTERIOR THIGH
LOCATION SIMPLE: RIGHT FOREHEAD
LOCATION SIMPLE: RIGHT FOREHEAD
LOCATION SIMPLE: RIGHT BREAST
LOCATION SIMPLE: RIGHT CLAVICULAR SKIN
LOCATION SIMPLE: ANTERIOR SCALP
LOCATION SIMPLE: LEFT FOREHEAD

## 2025-06-19 ASSESSMENT — LOCATION DETAILED DESCRIPTION DERM
LOCATION DETAILED: RIGHT SUPERIOR MEDIAL UPPER BACK
LOCATION DETAILED: EPIGASTRIC SKIN
LOCATION DETAILED: RIGHT INFERIOR MEDIAL MALAR CHEEK
LOCATION DETAILED: LEFT CENTRAL EYEBROW
LOCATION DETAILED: MEDIAL FRONTAL SCALP
LOCATION DETAILED: RIGHT SUPERIOR FOREHEAD
LOCATION DETAILED: LEFT DISTAL CALF
LOCATION DETAILED: LEFT MEDIAL MALAR CHEEK
LOCATION DETAILED: RIGHT ANTERIOR DISTAL THIGH
LOCATION DETAILED: LEFT CENTRAL MALAR CHEEK
LOCATION DETAILED: RIGHT ANTERIOR DISTAL UPPER ARM
LOCATION DETAILED: LEFT INFERIOR UPPER BACK
LOCATION DETAILED: RIGHT INFERIOR CENTRAL MALAR CHEEK
LOCATION DETAILED: LEFT ANTERIOR DISTAL UPPER ARM
LOCATION DETAILED: LEFT LOWER CUTANEOUS LIP
LOCATION DETAILED: SUPERIOR THORACIC SPINE
LOCATION DETAILED: RIGHT CLAVICULAR SKIN
LOCATION DETAILED: LEFT PROXIMAL POSTERIOR THIGH
LOCATION DETAILED: LEFT CENTRAL MALAR CHEEK
LOCATION DETAILED: LEFT MEDIAL FOREHEAD
LOCATION DETAILED: RIGHT VENTRAL DISTAL FOREARM
LOCATION DETAILED: MID-FRONTAL SCALP
LOCATION DETAILED: RIGHT LATERAL BREAST 6-7:00 REGION
LOCATION DETAILED: LEFT CENTRAL FRONTAL SCALP
LOCATION DETAILED: RIGHT PROXIMAL DORSAL FOREARM
LOCATION DETAILED: UPPER STERNUM
LOCATION DETAILED: RIGHT MEDIAL FOREHEAD
LOCATION DETAILED: LEFT DISTAL POSTERIOR THIGH
LOCATION DETAILED: LEFT FOREHEAD
LOCATION DETAILED: LEFT CENTRAL FRONTAL SCALP

## 2025-06-19 ASSESSMENT — LOCATION ZONE DERM
LOCATION ZONE: ARM
LOCATION ZONE: FACE
LOCATION ZONE: TRUNK
LOCATION ZONE: SCALP
LOCATION ZONE: SCALP
LOCATION ZONE: LEG
LOCATION ZONE: LIP
LOCATION ZONE: FACE

## 2025-06-19 NOTE — PROCEDURE: LIQUID NITROGEN
Detail Level: Detailed
Show Spray Paint Technique Variable?: Yes
Consent: The patient's consent was obtained including but not limited to risks of crusting, scabbing, blistering, scarring, darker or lighter pigmentary change, recurrence, incomplete removal and infection.
Medical Necessity Information: It is in your best interest to select a reason for this procedure from the list below. All of these items fulfill various CMS LCD requirements except the new and changing color options.
Spray Paint Technique: No
Medical Necessity Clause: This procedure was medically necessary because the lesions that were treated were: bleeding and enlarging
Spray Paint Text: The liquid nitrogen was applied to the skin utilizing a spray paint frosting technique.
Post-Care Instructions: I reviewed with the patient in detail post-care instructions. Patient is to wear sunprotection, and avoid picking at any of the treated lesions. Pt may apply Vaseline to crusted or scabbing areas.

## 2025-06-19 NOTE — PROCEDURE: BENIGN DESTRUCTION
Include Z78.9 (Other Specified Conditions Influencing Health Status) As An Associated Diagnosis?: No
Medical Necessity Information: It is in your best interest to select a reason for this procedure from the list below. All of these items fulfill various CMS LCD requirements except the new and changing color options.
Anesthesia Volume In Cc: 0.5
Detail Level: Detailed
Post-Care Instructions: I reviewed with the patient in detail post-care instructions. Patient is to wear sunprotection, and avoid picking at any of the treated lesions. Pt may apply Vaseline to crusted or scabbing areas.
Treatment Number (Will Not Render If 0): 0
Medical Necessity Clause: This procedure was medically necessary because the lesions that were treated were:
Consent: The patient's consent was obtained including but not limited to risks of crusting, scabbing, blistering, scarring, darker or lighter pigmentary change, recurrence, incomplete removal and infection.

## 2025-07-02 ENCOUNTER — RX ONLY (RX ONLY)
Age: 35
End: 2025-07-02

## 2025-07-02 ENCOUNTER — APPOINTMENT (OUTPATIENT)
Dept: URBAN - METROPOLITAN AREA CLINIC 329 | Facility: CLINIC | Age: 35
Setting detail: DERMATOLOGY
End: 2025-07-02

## 2025-07-02 DIAGNOSIS — K13.0 DISEASES OF LIPS: ICD-10-CM

## 2025-07-02 PROCEDURE — ? FULL BODY SKIN EXAM - DECLINED

## 2025-07-02 PROCEDURE — ? COUNSELING

## 2025-07-02 PROCEDURE — ? PRESCRIPTION

## 2025-07-02 RX ORDER — TRIAMCINOLONE ACETONIDE 1 MG/G
PASTE DENTAL
Qty: 5 | Refills: 2 | Status: ERX | COMMUNITY
Start: 2025-07-02

## 2025-07-02 RX ORDER — TRIAMCINOLONE ACETONIDE 1 MG/G
PASTE DENTAL
Qty: 5 | Refills: 2 | Status: ERX

## 2025-07-02 RX ADMIN — TRIAMCINOLONE ACETONIDE: 1 PASTE DENTAL at 00:00

## 2025-07-02 ASSESSMENT — LOCATION ZONE DERM
LOCATION ZONE: LIP
LOCATION ZONE: LIP

## 2025-07-02 ASSESSMENT — LOCATION DETAILED DESCRIPTION DERM
LOCATION DETAILED: RIGHT UPPER CUTANEOUS LIP
LOCATION DETAILED: LEFT ORAL COMMISSURE
LOCATION DETAILED: RIGHT SUPERIOR VERMILION BORDER

## 2025-07-02 ASSESSMENT — LOCATION SIMPLE DESCRIPTION DERM
LOCATION SIMPLE: RIGHT LIP
LOCATION SIMPLE: RIGHT UPPER LIP
LOCATION SIMPLE: LEFT LIP

## 2025-07-23 ENCOUNTER — APPOINTMENT (OUTPATIENT)
Dept: URBAN - METROPOLITAN AREA CLINIC 329 | Facility: CLINIC | Age: 35
Setting detail: DERMATOLOGY
End: 2025-07-23

## 2025-07-23 DIAGNOSIS — L71.8 OTHER ROSACEA: ICD-10-CM

## 2025-07-23 DIAGNOSIS — L72.0 EPIDERMAL CYST: ICD-10-CM

## 2025-07-23 DIAGNOSIS — K13.0 DISEASES OF LIPS: ICD-10-CM

## 2025-07-23 PROBLEM — L30.9 DERMATITIS, UNSPECIFIED: Status: ACTIVE | Noted: 2025-07-23

## 2025-07-23 PROCEDURE — ? PRESCRIPTION MEDICATION MANAGEMENT

## 2025-07-23 PROCEDURE — ? COUNSELING

## 2025-07-23 PROCEDURE — ? ADDITIONAL NOTES

## 2025-07-23 PROCEDURE — ? FULL BODY SKIN EXAM - DECLINED

## 2025-07-23 PROCEDURE — ? ELECTRODESICCATION

## 2025-07-23 ASSESSMENT — LOCATION DETAILED DESCRIPTION DERM
LOCATION DETAILED: RIGHT MID TEMPLE
LOCATION DETAILED: LEFT ORAL COMMISSURE
LOCATION DETAILED: LEFT SUPERIOR CENTRAL MALAR CHEEK
LOCATION DETAILED: RIGHT INFERIOR CENTRAL MALAR CHEEK
LOCATION DETAILED: SUBMENTAL CHIN
LOCATION DETAILED: RIGHT MEDIAL MALAR CHEEK
LOCATION DETAILED: RIGHT INFERIOR CENTRAL MALAR CHEEK
LOCATION DETAILED: RIGHT INFERIOR MEDIAL MALAR CHEEK
LOCATION DETAILED: INFERIOR MID FOREHEAD
LOCATION DETAILED: LEFT MEDIAL MALAR CHEEK
LOCATION DETAILED: LEFT CENTRAL MALAR CHEEK
LOCATION DETAILED: LEFT CHIN
LOCATION DETAILED: RIGHT INFERIOR FOREHEAD
LOCATION DETAILED: RIGHT CENTRAL BUCCAL CHEEK
LOCATION DETAILED: RIGHT UPPER CUTANEOUS LIP
LOCATION DETAILED: RIGHT INFERIOR LATERAL FOREHEAD
LOCATION DETAILED: RIGHT SUPERIOR VERMILION BORDER
LOCATION DETAILED: RIGHT MEDIAL FOREHEAD
LOCATION DETAILED: RIGHT SUPERIOR CENTRAL BUCCAL CHEEK

## 2025-07-23 ASSESSMENT — LOCATION SIMPLE DESCRIPTION DERM
LOCATION SIMPLE: RIGHT CHEEK
LOCATION SIMPLE: RIGHT TEMPLE
LOCATION SIMPLE: LEFT CHEEK
LOCATION SIMPLE: RIGHT UPPER LIP
LOCATION SIMPLE: RIGHT LIP
LOCATION SIMPLE: LEFT CHEEK
LOCATION SIMPLE: SUBMENTAL CHIN
LOCATION SIMPLE: INFERIOR FOREHEAD
LOCATION SIMPLE: LEFT LIP
LOCATION SIMPLE: RIGHT CHEEK
LOCATION SIMPLE: CHIN
LOCATION SIMPLE: RIGHT FOREHEAD

## 2025-07-23 ASSESSMENT — LOCATION ZONE DERM
LOCATION ZONE: LIP
LOCATION ZONE: FACE
LOCATION ZONE: LIP
LOCATION ZONE: FACE

## 2025-07-23 NOTE — PROCEDURE: PRESCRIPTION MEDICATION MANAGEMENT
Detail Level: Zone
Render In Strict Bullet Format?: No
Continue Regimen: Zilxi and soolantra
Continue Regimen: triamcinolone acetonide 0.1 % dental paste - Apply once to affected area nightly until resolved

## 2025-07-23 NOTE — PROCEDURE: ELECTRODESICCATION
October 11, 2018      Aminta Mckinney MD  [address]  [place] [address]           [facility] - OB/GYN 5th Floor  [address]  [facility] [address]  Phone: [phone]          Patient: Gita Gr   MR Number: [id number]   YOB: 1947   Date of Visit: 10/11/2018       Dear Dr. Aminta Mckinney:    Thank you for referring Gita Gr to me for evaluation. Attached you will find relevant portions of my assessment and plan of care.    If you have questions, please do not hesitate to call me. I look forward to following Gita Gr along with you.    Sincerely,    HENRIQUE Stein, PROMISE    Enclosure  CC:  No Recipients    If you would like to receive this communication electronically, please contact [email or web address] or [phone] to request more information on [employer] access.    For providers and/or their staff who would like to refer a patient to [facility], please contact us through our one-stop-shop provider referral line, Critical access hospitalierge, at [phone].    If you feel you have received this communication in error or would no longer like to receive these types of communications, please e-mail [email or web address]         
Anesthesia Type: 1% lidocaine with epinephrine
Medical Necessity Information: It is in your best interest to select a reason for this procedure from the list below. All of these items fulfill various CMS LCD requirements except the new and changing color options.
Include Z78.9 (Other Specified Conditions Influencing Health Status) As An Associated Diagnosis?: No
Consent: The patient's consent was obtained including but not limited to risks of crusting, scabbing, blistering, scarring, darker or lighter pigmentary change, recurrence, incomplete removal and infection.
Medical Necessity Clause: This procedure was medically necessary because the lesions that were treated were:
Detail Level: Simple
Post-Care Instructions: I reviewed with the patient in detail post-care instructions. Patient is to wear sunprotection, and avoid picking at any of the treated lesions. Pt may apply Vaseline to crusted or scabbing areas

## 2025-07-23 NOTE — PROCEDURE: ADDITIONAL NOTES
Detail Level: Zone
Additional Notes: Discussed that due to the strength of the TMC and frequency of use there is minimal risk of her skin developing a steroid dependency.
Render Risk Assessment In Note?: no
Additional Notes: Discussed making sure that she is applying topicals to affected area.